# Patient Record
Sex: FEMALE | HISPANIC OR LATINO | Employment: PART TIME | URBAN - METROPOLITAN AREA
[De-identification: names, ages, dates, MRNs, and addresses within clinical notes are randomized per-mention and may not be internally consistent; named-entity substitution may affect disease eponyms.]

---

## 2017-07-10 ENCOUNTER — NON-PROVIDER VISIT (OUTPATIENT)
Dept: OBGYN | Facility: CLINIC | Age: 32
End: 2017-07-10

## 2017-07-10 DIAGNOSIS — Z32.01 PREGNANCY EXAMINATION OR TEST, POSITIVE RESULT: ICD-10-CM

## 2017-07-10 LAB
INT CON NEG: NEGATIVE
INT CON POS: POSITIVE
POC URINE PREGNANCY TEST: POSITIVE

## 2017-07-10 PROCEDURE — 81025 URINE PREGNANCY TEST: CPT | Performed by: OBSTETRICS & GYNECOLOGY

## 2017-07-13 ENCOUNTER — APPOINTMENT (OUTPATIENT)
Dept: OBGYN | Facility: CLINIC | Age: 32
End: 2017-07-13

## 2017-08-28 ENCOUNTER — INITIAL PRENATAL (OUTPATIENT)
Dept: OBGYN | Facility: CLINIC | Age: 32
End: 2017-08-28

## 2017-08-28 ENCOUNTER — HOSPITAL ENCOUNTER (OUTPATIENT)
Dept: LAB | Facility: MEDICAL CENTER | Age: 32
End: 2017-08-28
Attending: OBSTETRICS & GYNECOLOGY

## 2017-08-28 VITALS
DIASTOLIC BLOOD PRESSURE: 60 MMHG | SYSTOLIC BLOOD PRESSURE: 106 MMHG | HEIGHT: 63 IN | BODY MASS INDEX: 29.06 KG/M2 | WEIGHT: 164 LBS

## 2017-08-28 DIAGNOSIS — N94.6 DYSMENORRHEA: ICD-10-CM

## 2017-08-28 DIAGNOSIS — O03.9 SAB (SPONTANEOUS ABORTION): Primary | ICD-10-CM

## 2017-08-28 DIAGNOSIS — Z32.01 PREGNANCY EXAMINATION OR TEST, POSITIVE RESULT: ICD-10-CM

## 2017-08-28 DIAGNOSIS — Z34.81 ENCOUNTER FOR SUPERVISION OF OTHER NORMAL PREGNANCY IN FIRST TRIMESTER: Primary | ICD-10-CM

## 2017-08-28 DIAGNOSIS — O03.9 SAB (SPONTANEOUS ABORTION): ICD-10-CM

## 2017-08-28 PROBLEM — Z34.91 ENCOUNTER FOR SUPERVISION OF NORMAL PREGNANCY IN FIRST TRIMESTER: Status: ACTIVE | Noted: 2017-08-28

## 2017-08-28 LAB
APPEARANCE UR: NORMAL
B-HCG SERPL-ACNC: 8001.2 MIU/ML (ref 0–5)
BILIRUB UR STRIP-MCNC: NORMAL MG/DL
COLOR UR AUTO: NORMAL
GLUCOSE UR STRIP.AUTO-MCNC: NEGATIVE MG/DL
INT CON NEG: NEGATIVE
INT CON POS: POSITIVE
KETONES UR STRIP.AUTO-MCNC: NEGATIVE MG/DL
LEUKOCYTE ESTERASE UR QL STRIP.AUTO: NEGATIVE
NITRITE UR QL STRIP.AUTO: NEGATIVE
PH UR STRIP.AUTO: 6.5 [PH] (ref 5–8)
POC URINE PREGNANCY TEST: POSITIVE
PROGEST SERPL-MCNC: 7.82 NG/ML
PROT UR QL STRIP: NEGATIVE MG/DL
RBC UR QL AUTO: NORMAL
SP GR UR STRIP.AUTO: 1.02
UROBILINOGEN UR STRIP-MCNC: NORMAL MG/DL

## 2017-08-28 PROCEDURE — 84144 ASSAY OF PROGESTERONE: CPT

## 2017-08-28 PROCEDURE — 81025 URINE PREGNANCY TEST: CPT | Performed by: NURSE PRACTITIONER

## 2017-08-28 PROCEDURE — 99213 OFFICE O/P EST LOW 20 MIN: CPT | Mod: 25 | Performed by: OBSTETRICS & GYNECOLOGY

## 2017-08-28 PROCEDURE — 86038 ANTINUCLEAR ANTIBODIES: CPT

## 2017-08-28 PROCEDURE — 84702 CHORIONIC GONADOTROPIN TEST: CPT

## 2017-08-28 PROCEDURE — 36415 COLL VENOUS BLD VENIPUNCTURE: CPT

## 2017-08-28 PROCEDURE — 76817 TRANSVAGINAL US OBSTETRIC: CPT | Performed by: OBSTETRICS & GYNECOLOGY

## 2017-08-28 PROCEDURE — 81002 URINALYSIS NONAUTO W/O SCOPE: CPT | Performed by: NURSE PRACTITIONER

## 2017-08-28 RX ORDER — IBUPROFEN 800 MG/1
800 TABLET ORAL EVERY 8 HOURS PRN
Qty: 30 TAB | Refills: 3 | Status: SHIPPED | OUTPATIENT
Start: 2017-08-28 | End: 2018-08-15

## 2017-08-28 RX ORDER — MISOPROSTOL 200 UG/1
200 TABLET ORAL 4 TIMES DAILY
Qty: 6 TAB | Refills: 0 | COMMUNITY
Start: 2017-08-28 | End: 2018-08-15

## 2017-08-28 ASSESSMENT — ENCOUNTER SYMPTOMS
PSYCHIATRIC NEGATIVE: 1
EYES NEGATIVE: 1
CARDIOVASCULAR NEGATIVE: 1
GASTROINTESTINAL NEGATIVE: 1
CONSTITUTIONAL NEGATIVE: 1
NEUROLOGICAL NEGATIVE: 1
RESPIRATORY NEGATIVE: 1
MUSCULOSKELETAL NEGATIVE: 1

## 2017-08-28 NOTE — LETTER
Estudios Para Detectar los Portadores de la Fibrosis Quística   (La Enfermedad Fibroquística del Páncreas)    Marietta Schaeferquez    Esta información esta relacionada con un examen de kenisha que puede determinar si usted o pimentel nicolasa es portador del gen que causa la enfermedad hereditaria que se llama la fibrosis quística.     ¿QUE ES LA ENFERMEDAD FIBROSIS QUÍSTICA?  · La  fibrosis quística es rosy enfermedad hereditaria que afecta a más de 25,000 niños y jóvenes adultos americanos.  · Los síntomas de la fibrosis quística varían de rosy persona a otra.  Los síntomas más comunes son congestión pulmonar, diarrea y retraso en el crecimiento del ras.  La mayoría de las personas con la fibrosis quística padecen de problemas médicos muy severos, y algunas mueren jóvenes.  Otras tienen tan pocos síntomas que no se percatan de que tienen fibrosis quística.  · La fibrosis quística no afecta en absoluto la inteligencia de la persona.  · Aunque actualmente no hay rosy maral para la fibrosis quística, los científicos están avanzando con respecto a nuevos tratamientos y en la búsqueda de la maral.  Anteriormente la mayoría de las personas que padecían de fibrosis quística morían muy jóvenes.  Hoy en día, muchas personas que padecen de la fibrosis quística sobreviven hasta los 20 o 30 anos de edad.    ¿EXISTE LA POSIBILIDAD DE QUE MI CRYSTAL PUEDA TENER FIBROSIS QUÍSTICA?  · Usted puede tener un hijo con la fibrosis quística aun cuando no hay nadie en pimentel jacqui que la padezca.  (Cindy la grafica que sigue.)  · Existe rosy prueba que puede ayudarle a determinar si mallory un gen para la fibrosis quística y si por eso corre un riesgo de tener un hijo con la enfermedad.  · Si ambos padres son portadores del gen para la fibrosis quística, hay rosy (1) probabilidad entre 4 (25%) en cada embarazo, de que puedan tener un hijo afectada con fibrosis quística.  · Los portadores del gen para la fibrosis quística tienen rosy copia del  gen normal y el otro gen alterado.  · Las personas con fibrosis quística tienen dos genes alterados para la fibrosis quística,  · Normalmente la mayoría de individuos tienen dos copias normales del gen para fibrosis quística.    Riesgo aproximado de que rosy nicolasa sin familiares con fibrosis quística pueda tener un hijo con fibrosis quística:  Origen / Riesgo  Rosy nicolasa Caucásica (de christine win):  1 en 2,500  Rosy nicolasa :  1 en 8,000  Rosy nicolasa Afroamericana (de christine trevon):  1 en 15,000  Rosy nicolasa Asiática:  1 en 32,000    ¿EXISTEN PRUEBAS PARA DETECTAR LOS PORTADORES DE LA FIBROSIS QUÍSTICA?  · Hay rosy prueba de kenisha para determinar si usted o pimentel nicolasa portan el gen para la fibrosis quística.  · Es importante entender que la prueba no detecta todos los portadores del gen para la fibrosis quística.  · Si la prueba determina que ambos padres con portadores, se puede realizar otras pruebas para determinar si pimentel futuro efrain esta afectado.    ¿CUANTO VICENTE LA PRUEBA PARA DETERMINAR SI SOY PORTADOR(A) DEL GEN PARA LA FIBROSIS QUÍSTICA?  · El costo de la prueba varia según pimentel póliza de seguro medico y el laboratorio donde se efectúa el análisis.  · El costo promedio de la prueba es de $780.00  · Pimentel consejera genética puede darle mas información acera de esta prueba para determinar si usted es portador de la fibrosis quística.    _____  Si, yo estoy interesada y me gustaria obtener mas información al respecto.  _____  No tengo interés ni en hacerme la prueba para determinar si soy portador(a) de gen para la fibrosis quística ni en recibir mas información al respecto.    Firma del paciente: _____________________________   8/28/2017

## 2017-08-28 NOTE — PROGRESS NOTES
Pt here for New OB today  Phone: [phone]  Pharmacy verified  Pt is taking PNV  Desires AFP   Desires BTL   Pt c/o occasional HA

## 2017-08-28 NOTE — LETTER
August 28, 2017       Patient: Marietta Paris   YOB: 1985   Date of Visit: 8/28/2017         To Whom It May Concern:    It is my medical opinion that Marietta Paris needs to be off work from today until 9/4/17 due to medical complications.    If you have any questions or concerns, please don't hesitate to call [phone]          Sincerely,          Minh Adan M.D.  Electronically Signed

## 2017-08-28 NOTE — PROGRESS NOTES
S:  Marietta Paris is a 32 y.o.  who presents for her new OB exam.  She is 12w0d with and MELIDA of Estimated Date of Delivery: 3/12/18 based off of LMP . She has no complaints.  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 No ER visits or previous care in this pregnancy.     Desires AFP.  Declines CF.  Denies VB, LOF, or cramping.  Denies dysuria, vaginal DC. Reports no fetal movement.     Pt is  and lives with FOB and kids.  Pregnancy is planned and desired.      UPT + in 7/10/17, GA at that time was 5w0d based off of LMP. Should be 12w0d today. FHT's not able to be detected via doppler. BSUS performed, uterus appears empty. Monica Santiago CNM at bedside to confirm, and again, uterus appears empty. Will recheck UPT, and consult with MD.    UPT+ again. Consult with Dr Adan, will do US and discuss findings with patient.    No past medical history on file.  Family History   Problem Relation Age of Onset   • No Known Problems Mother    • No Known Problems Father    • No Known Problems Sister    • No Known Problems Brother      Social History     Social History   • Marital status:      Spouse name: N/A   • Number of children: N/A   • Years of education: N/A     Occupational History   • Not on file.     Social History Main Topics   • Smoking status: Never Smoker   • Smokeless tobacco: Never Used   • Alcohol use Yes      Comment: socially; before pregnancy   • Drug use: Unknown   • Sexual activity: Yes     Partners: Male     Other Topics Concern   • Not on file     Social History Narrative   • No narrative on file     OB History    Para Term  AB Living   3 2 1 1   2   SAB TAB Ectopic Molar Multiple Live Births             2      # Outcome Date GA Lbr Christofer/2nd Weight Sex Delivery Anes PTL Lv   3 Current            2  04 36w0d  3.175 kg (7 lb) F Vag-Spont  Y JESUS      Birth Comments: No complications   1 Term 02  "40w0d  3.175 kg (7 lb) F Vag-Spont  N JESUS      Birth Comments: No complications          History of Varicella Virus: yes  History of HSV I or II in self or partner: no  History of Thyroid problems: no    O:  Blood pressure 106/60, height 1.6 m (5' 3\"), weight 74.4 kg (164 lb), last menstrual period 06/05/2017.   See Prenatal Physical.    Wet mount: Deferred, no s/sx      A:   1.  IUP @ 12w0d per LMP        2.  S=D        3.  See problem list below        4.  ? AB, consult with MD for viability       Patient Active Problem List    Diagnosis Date Noted   • Encounter for supervision of normal pregnancy in first trimester 08/28/2017         P:  1.  GC/CT & pap not done- patient declines until pregnancy is confirmed.        2.  Prenatal labs ordered - lab slip given        3.  Discussed PNV, diet, avoidances and adequate water intake        4.  NOB packet given        5.  Return to office in 4 wks        6.  Complete OB US in 8 wks        7.  US with OB to determine viability    No orders of the defined types were placed in this encounter.      HPI    Review of Systems   Constitutional: Negative.    HENT: Negative.    Eyes: Negative.    Respiratory: Negative.    Cardiovascular: Negative.    Gastrointestinal: Negative.    Genitourinary: Negative.    Musculoskeletal: Negative.    Skin: Negative.    Neurological: Negative.    Endo/Heme/Allergies: Negative.    Psychiatric/Behavioral: Negative.    All other systems reviewed and are negative.         Objective:     /60   Ht 1.6 m (5' 3\")   Wt 74.4 kg (164 lb)   LMP 06/05/2017 (Exact Date)   BMI 29.05 kg/m²      Physical Exam   Constitutional: She is oriented to person, place, and time. She appears well-developed and well-nourished.   HENT:   Head: Normocephalic and atraumatic.   Nose: Nose normal.   Eyes: Conjunctivae and EOM are normal.   Neck: Normal range of motion. Neck supple.   Cardiovascular: Normal rate, regular rhythm, normal heart sounds and intact distal " pulses.    Pulmonary/Chest: Effort normal and breath sounds normal.   Abdominal: Soft. Bowel sounds are normal.   Genitourinary: Vagina normal and uterus normal.   Musculoskeletal: Normal range of motion.   Neurological: She is alert and oriented to person, place, and time. She has normal reflexes.   Skin: Skin is warm and dry. Capillary refill takes less than 2 seconds.   Psychiatric: She has a normal mood and affect. Her behavior is normal. Judgment and thought content normal.   Nursing note and vitals reviewed.         Assessment/Plan:     1. Encounter for supervision of other normal pregnancy in first trimester  MELIDA 3/12/18 per LMP. Refer to MD to determine viability  - PREG CNTR PRENATAL PN; Future  - THINPREP RFLX HPV ASCUS W/CTNG; Future  - US-OB 2ND 3RD TRI COMPLETE; Future  - POCT Urinalysis

## 2017-08-28 NOTE — PROGRESS NOTES
Marietta Paris,  32 y.o.  female presents today with a C/O of :oligomenorrhea. Pt   Patient's last menstrual period was 2017 (exact date).       Subjective : Nausea/Vomiting: No:  Abdominal /pelvic cramping : No :   vaginal bleeding:No      Menstrual Flow : moderate   GYN ROS:  normal menses, no abnormal bleeding, pelvic pain or discharge, no breast pain or new or enlarging lumps on self exam      No past medical history on file.    No past surgical history on file.    Current Birth control:  none    OB History    Para Term  AB Living   3 2 1 1   2   SAB TAB Ectopic Molar Multiple Live Births             2      # Outcome Date GA Lbr Christofer/2nd Weight Sex Delivery Anes PTL Lv   3 Current            2  04 36w0d  3.175 kg (7 lb) F Vag-Spont  Y JESUS      Birth Comments: No complications   1 Term 02 40w0d  3.175 kg (7 lb) F Vag-Spont  N JESUS      Birth Comments: No complications              Allergy:      Review of patient's allergies indicates no known allergies.    Exam;    LMP 2017 (Exact Date)   well-appearing, well-hydrated, well-nourished, overweight, in no apparent distress  normal;   PERRLA, EOMI, fundi grossly normal, no papilledema, no AV nicking, sclera clear  Clear to auscultation  RRR No M  abdomen is soft without significant tenderness, masses, organomegaly or guarding  External genitalia normal, Vagina normal without discharge, Urethra without abnormality or dischargeLab.    Recent Results (from the past 336 hour(s))   POCT Urinalysis    Collection Time: 17  1:02 PM   Result Value Ref Range    POC Color  Negative    POC Appearance  Negative    POC Leukocyte Esterase NEGATIVE Negative    POC Nitrites NEGATIVE Negative    POC Urobiligen  Negative (0.2) mg/dL    POC Protein NEGATIVE Negative mg/dL    POC Urine PH 6.5 5.0 - 8.0    POC Blood HEMOLYZED Negative    POC Specific Gravity 1.020 <1.005 - >1.030    POC Ketones NEGATIVE Negative mg/dL     POC Biliruben  Negative mg/dL    POC Glucose NEGATIVE Negative mg/dL   POCT Pregnancy    Collection Time: 08/28/17  1:55 PM   Result Value Ref Range    POC Urine Pregnancy Test POSITIVE Negative    Internal Control Positive Positive     Internal Control Negative Negative      Ultrasound :     First trimester findings: Intrauterine gestational sac seen: yes  Gestational sac summary: empty sac  Fetal cardiac activity: absent  Crown-rump length: no pole , no debris ,     Assessment:    Blighted ovum   Spent 25 minutes with the patient ; Face to Face, with >50% of this time spent in counseling and coordination of care, surrounding the above mentioned issues as well as: explained possible causes for pregnancy lost , what work up should be done and options to terminate pregnancy     Plan:  Cytotec 600 ug per vagina , followed by 600 ug orally after 2 hr  Motrin 800 mg   Explained in Citizen of [place] to patient and SO , what to expect   4 weeks  Serial prog/hcg , Check TOMASZ as well

## 2017-08-30 ENCOUNTER — HOSPITAL ENCOUNTER (OUTPATIENT)
Dept: LAB | Facility: MEDICAL CENTER | Age: 32
End: 2017-08-30
Attending: OBSTETRICS & GYNECOLOGY
Payer: COMMERCIAL

## 2017-08-30 ENCOUNTER — TELEPHONE (OUTPATIENT)
Dept: OBGYN | Facility: CLINIC | Age: 32
End: 2017-08-30

## 2017-08-30 DIAGNOSIS — N94.6 DYSMENORRHEA: ICD-10-CM

## 2017-08-30 DIAGNOSIS — O03.9 SAB (SPONTANEOUS ABORTION): ICD-10-CM

## 2017-08-30 LAB
B-HCG SERPL-ACNC: 2839.3 MIU/ML (ref 0–5)
NUCLEAR IGG SER QL IA: NORMAL
PROGEST SERPL-MCNC: 2.37 NG/ML

## 2017-08-30 NOTE — TELEPHONE ENCOUNTER
----- Message from Minh Adan M.D. sent at 8/29/2017  9:08 PM PDT -----  C/w blighted ovum      Called pt and informed as above and advised to follow Dr. Adan's instructions. Pt verbalized understanding. Pt stated she did not make the f/u appt as instructed by Dr. Adan. Pt transferred to [facility] to schedule f/u appt.

## 2017-09-29 ENCOUNTER — POST PARTUM (OUTPATIENT)
Dept: OBGYN | Facility: CLINIC | Age: 32
End: 2017-09-29

## 2017-09-29 ENCOUNTER — HOSPITAL ENCOUNTER (OUTPATIENT)
Dept: LAB | Facility: MEDICAL CENTER | Age: 32
End: 2017-09-29
Attending: OBSTETRICS & GYNECOLOGY
Payer: COMMERCIAL

## 2017-09-29 VITALS
HEIGHT: 63 IN | SYSTOLIC BLOOD PRESSURE: 112 MMHG | WEIGHT: 166 LBS | DIASTOLIC BLOOD PRESSURE: 58 MMHG | BODY MASS INDEX: 29.41 KG/M2

## 2017-09-29 DIAGNOSIS — O03.9 SAB (SPONTANEOUS ABORTION): ICD-10-CM

## 2017-09-29 DIAGNOSIS — Z32.02 PREGNANCY EXAMINATION OR TEST, NEGATIVE RESULT: ICD-10-CM

## 2017-09-29 LAB
ABO GROUP BLD: NORMAL
INT CON NEG: NEGATIVE
INT CON POS: POSITIVE
POC URINE PREGNANCY TEST: NEGATIVE
RH BLD: NORMAL

## 2017-09-29 PROCEDURE — 81025 URINE PREGNANCY TEST: CPT | Performed by: OBSTETRICS & GYNECOLOGY

## 2017-09-29 PROCEDURE — 99213 OFFICE O/P EST LOW 20 MIN: CPT | Mod: 25 | Performed by: OBSTETRICS & GYNECOLOGY

## 2017-09-29 NOTE — PROGRESS NOTES
32-year-old  112 who is here today for follow-up of SAB, patient was given intravaginal Cytotec followed by by mouth Cytotec over one month ago. Patient states that after she got the Cytotec she had passed all of the tissue that evening, since then she is been doing well and believe she may have had a period on . She is currently without complaints today no bleeding or spotting. No abdominal pain, no fevers chest pain shortness of breath    Physical exam is deferred    Urine pregnancy test today is negative    32-year-old female status post SAB with medical termination  Blood type has not been reviewed or checked on this patient so will order ABO with Rh determination today  Over one month now from termination, may or may not be able to give RhoGAM  Hopefully patient's blood type is Rh+

## 2017-09-29 NOTE — NON-PROVIDER
SAB f/u today  Cytotec given on 8-28-17  Pt reports she passed everything on the night of 8-28-17, had heavy bleeding for 2 weeks and stopped; pt states she started bleeding again on 9-24-17  Phone: [phone]

## 2018-04-12 ENCOUNTER — NON-PROVIDER VISIT (OUTPATIENT)
Dept: OBGYN | Facility: CLINIC | Age: 33
End: 2018-04-12

## 2018-04-12 DIAGNOSIS — Z32.01 POSITIVE URINE PREGNANCY TEST: ICD-10-CM

## 2018-04-12 LAB
INT CON NEG: NEGATIVE
INT CON POS: POSITIVE
POC URINE PREGNANCY TEST: POSITIVE

## 2018-04-12 PROCEDURE — 81025 URINE PREGNANCY TEST: CPT | Performed by: OBSTETRICS & GYNECOLOGY

## 2018-05-02 ENCOUNTER — INITIAL PRENATAL (OUTPATIENT)
Dept: OBGYN | Facility: CLINIC | Age: 33
End: 2018-05-02

## 2018-05-02 VITALS — SYSTOLIC BLOOD PRESSURE: 118 MMHG | WEIGHT: 170 LBS | BODY MASS INDEX: 30.11 KG/M2 | DIASTOLIC BLOOD PRESSURE: 70 MMHG

## 2018-05-02 DIAGNOSIS — N93.8 DUB (DYSFUNCTIONAL UTERINE BLEEDING): ICD-10-CM

## 2018-05-02 PROCEDURE — 76830 TRANSVAGINAL US NON-OB: CPT | Performed by: OBSTETRICS & GYNECOLOGY

## 2018-05-02 PROCEDURE — 99213 OFFICE O/P EST LOW 20 MIN: CPT | Mod: 25 | Performed by: OBSTETRICS & GYNECOLOGY

## 2018-05-02 RX ORDER — DOXYLAMINE SUCCINATE AND PYRIDOXINE HYDROCHLORIDE, DELAYED RELEASE TABLETS 10 MG/10 MG 10; 10 MG/1; MG/1
2 TABLET, DELAYED RELEASE ORAL
Qty: 60 TAB | Refills: 2 | Status: ON HOLD | OUTPATIENT
Start: 2018-05-02 | End: 2018-12-07

## 2018-05-02 NOTE — PROGRESS NOTES
Patient here for a   LMP: 3/5/2018  Patient is taking PNV   Phone #: [phone]  Pharmacy Confirmed w/Pt.  C/O: Nausea and Headaches

## 2018-05-02 NOTE — PROGRESS NOTES
Chief complaint;  This patient is a 33 y.o. female , Patient's last menstrual period was 2018. presents complaining of dysfunctional uterine bleeding.    Review of systems-denies; chest pain, shortness of breath, fever, chills, abdominal pain  Past OB history-  OB History    Para Term  AB Living   4 2 1 1 1 2   SAB TAB Ectopic Molar Multiple Live Births   1         2      # Outcome Date GA Lbr Christofer/2nd Weight Sex Delivery Anes PTL Lv   4 Current            3 SAB 17 12w0d             Birth Comments: No D&C   2  04 36w0d  3.175 kg (7 lb) F Vag-Spont  Y JESUS      Birth Comments: No complications   1 Term 02 40w0d  3.175 kg (7 lb) F Vag-Spont  N JESUS      Birth Comments: No complications        Past surgical history- History reviewed. No pertinent surgical history.  Past medical history- History reviewed. No pertinent past medical history.  Allergies- Patient has no known allergies.  Present medications-   Outpatient Encounter Prescriptions as of 2018   Medication Sig Dispense Refill   • Prenatal Multivit-Min-Fe-FA (PRENATAL VITAMINS PO) Take  by mouth.     • misoprostol (CYTOTEC) 200 MCG Tab Take 1 Tab by mouth 4 times a day. (Patient not taking: Reported on 2018) 6 Tab 0   • ibuprofen (MOTRIN) 800 MG Tab Take 1 Tab by mouth every 8 hours as needed for Moderate Pain. (Patient not taking: Reported on 2018) 30 Tab 3     No facility-administered encounter medications on file as of 2018.      Family history- no history of breast or ovarian cancer  Social history-   Social History     Social History   • Marital status:      Spouse name: N/A   • Number of children: N/A   • Years of education: N/A     Occupational History   • Not on file.     Social History Main Topics   • Smoking status: Never Smoker   • Smokeless tobacco: Never Used   • Alcohol use Yes      Comment: socially; before pregnancy   • Drug use: Unknown   • Sexual activity: Yes     Partners:  Male      Comment: planned pregnancy      Other Topics Concern   • Not on file     Social History Narrative   • No narrative on file         Physical examination;   Alert and oriented x3  General-well-developed well-nourished female in no apparent distress  Vitals:    05/02/18 0842   BP: 118/70   Weight: 77.1 kg (170 lb)     Skin is warm and dry   HEENT-normocephalic,nontraumatic,PERRLA,EOMI  Throat- no edema or erythema  Neck-supple  Cardiovascular-regular rate and rhythm, normal S1-S2 without murmurs or gallops  Lungs-clear to auscultation bilaterally  Back-negative CVA tenderness  Abdomen-nondistended positive bowel sounds soft nontender without masses or hepatosplenomegaly  Pelvic examination;  External genitalia-without lesions  Vagina-no blood, no discharge  Cervix-closed,no gross lesions  Uterus-  8 weeks size, nontender  Adnexa- without mass or tenderness  Extremities-without cyanosis clubbing or edema  Neurologic-grossly intact    Transvaginal ultrasound; as performed and read by myself; intrauterine gestational sac containing wetzel pregnancy positive cardiac motion crown-rump length consistent with 8 weeks 5 days, EDC 12/7/18, no free pelvic fluid, no obvious adnexal masses      Impression;  Dysfunctional uterine bleeding-viable pregnancy at 8 weeks    Plan;        Patient to followup 4 weeks for initial OB

## 2018-05-02 NOTE — PROGRESS NOTES
Chief complaint;  This patient is a 33 y.o. female , Patient's last menstrual period was 2018. presents complaining of dysfunctional uterine bleeding.    Review of systems-denies; chest pain, shortness of breath, fever, chills, abdominal pain  Past OB history-  OB History    Para Term  AB Living   4 2 1 1 1 2   SAB TAB Ectopic Molar Multiple Live Births   1         2      # Outcome Date GA Lbr Christofer/2nd Weight Sex Delivery Anes PTL Lv   4 Current            3 SAB 17 12w0d             Birth Comments: No D&C   2  04 36w0d  3.175 kg (7 lb) F Vag-Spont  Y JESUS      Birth Comments: No complications   1 Term 02 40w0d  3.175 kg (7 lb) F Vag-Spont  N JESUS      Birth Comments: No complications        Past surgical history- History reviewed. No pertinent surgical history.  Past medical history- History reviewed. No pertinent past medical history.  Allergies- Patient has no known allergies.  Present medications-   Outpatient Encounter Prescriptions as of 2018   Medication Sig Dispense Refill   • Prenatal Multivit-Min-Fe-FA (PRENATAL VITAMINS PO) Take  by mouth.     • misoprostol (CYTOTEC) 200 MCG Tab Take 1 Tab by mouth 4 times a day. (Patient not taking: Reported on 2018) 6 Tab 0   • ibuprofen (MOTRIN) 800 MG Tab Take 1 Tab by mouth every 8 hours as needed for Moderate Pain. (Patient not taking: Reported on 2018) 30 Tab 3     No facility-administered encounter medications on file as of 2018.      Family history- no history of breast or ovarian cancer  Social history-   Social History     Social History   • Marital status:      Spouse name: N/A   • Number of children: N/A   • Years of education: N/A     Occupational History   • Not on file.     Social History Main Topics   • Smoking status: Never Smoker   • Smokeless tobacco: Never Used   • Alcohol use Yes      Comment: socially; before pregnancy   • Drug use: Unknown   • Sexual activity: Yes     Partners:  Male      Comment: planned pregnancy      Other Topics Concern   • Not on file     Social History Narrative   • No narrative on file         Physical examination;   Alert and oriented x3  General-well-developed well-nourished female in no apparent distress  There were no vitals filed for this visit.  Skin is warm and dry   HEENT-normocephalic,nontraumatic,PERRLA,EOMI  Throat- no edema or erythema  Neck-supple  Cardiovascular-regular rate and rhythm, normal S1-S2 without murmurs or gallops  Lungs-clear to auscultation bilaterally  Back-negative CVA tenderness  Abdomen-nondistended positive bowel sounds soft nontender without masses or hepatosplenomegaly  Pelvic examination;  External genitalia-without lesions  Vagina-no blood, no discharge  Cervix-closed,no gross lesions  Uterus-  *** weeks size, nontender  Adnexa- without mass or tenderness  Extremities-without cyanosis clubbing or edema  Neurologic-grossly intact    Transvaginal ultrasound; as performed and read by myself; ***    Impression;  Dysfunctional uterine bleeding- ***    Plan;   SS- ***  Labs today   Patient to followup ***      *** Minutes total spent with the patient in face-to-face contact,5 minutes of total time utilized to perform  Ultrasound, greater than 50% of the time has been spent on counseling and coordination of care. Many questions answered regarding possible miscarriage.

## 2018-05-09 ENCOUNTER — APPOINTMENT (OUTPATIENT)
Dept: OBGYN | Facility: CLINIC | Age: 33
End: 2018-05-09

## 2018-05-30 ENCOUNTER — TELEPHONE (OUTPATIENT)
Dept: OBGYN | Facility: CLINIC | Age: 33
End: 2018-05-30

## 2018-05-30 NOTE — TELEPHONE ENCOUNTER
Pt C/O states having headaches and cough, wants to know what she can take for this.    Unable to contact pt msg left to call back.     5/30/18 @ 09066, advised to use OTC Tylenol for headaches, Robitussin or Mucinex for cough, ocean nasal spray for stuffy nose, plenty water.  Verbalized understanding.

## 2018-06-22 ENCOUNTER — INITIAL PRENATAL (OUTPATIENT)
Dept: OBGYN | Facility: CLINIC | Age: 33
End: 2018-06-22

## 2018-06-22 ENCOUNTER — HOSPITAL ENCOUNTER (OUTPATIENT)
Facility: MEDICAL CENTER | Age: 33
End: 2018-06-22
Attending: PHYSICIAN ASSISTANT
Payer: COMMERCIAL

## 2018-06-22 VITALS — BODY MASS INDEX: 29.94 KG/M2 | DIASTOLIC BLOOD PRESSURE: 72 MMHG | WEIGHT: 169 LBS | SYSTOLIC BLOOD PRESSURE: 114 MMHG

## 2018-06-22 DIAGNOSIS — O09.892 HISTORY OF PRETERM DELIVERY, CURRENTLY PREGNANT IN SECOND TRIMESTER: ICD-10-CM

## 2018-06-22 DIAGNOSIS — Z34.80 SUPERVISION OF OTHER NORMAL PREGNANCY, ANTEPARTUM: Primary | ICD-10-CM

## 2018-06-22 DIAGNOSIS — Z34.80 SUPERVISION OF OTHER NORMAL PREGNANCY, ANTEPARTUM: ICD-10-CM

## 2018-06-22 LAB
APPEARANCE UR: NORMAL
BILIRUB UR STRIP-MCNC: NORMAL MG/DL
COLOR UR AUTO: NORMAL
GLUCOSE UR STRIP.AUTO-MCNC: NEGATIVE MG/DL
KETONES UR STRIP.AUTO-MCNC: NEGATIVE MG/DL
LEUKOCYTE ESTERASE UR QL STRIP.AUTO: NORMAL
NITRITE UR QL STRIP.AUTO: NEGATIVE
PH UR STRIP.AUTO: 5.5 [PH] (ref 5–8)
PROT UR QL STRIP: NEGATIVE MG/DL
RBC UR QL AUTO: NORMAL
SP GR UR STRIP.AUTO: 1.02
UROBILINOGEN UR STRIP-MCNC: NORMAL MG/DL

## 2018-06-22 PROCEDURE — 81002 URINALYSIS NONAUTO W/O SCOPE: CPT | Performed by: PHYSICIAN ASSISTANT

## 2018-06-22 PROCEDURE — 59401 PR NEW OB VISIT: CPT | Performed by: PHYSICIAN ASSISTANT

## 2018-06-22 PROCEDURE — 8198 PREG CTR PKG RATE (SYSTEM): Performed by: PHYSICIAN ASSISTANT

## 2018-06-22 NOTE — LETTER
Estudios Para Detectar los Portadores de la Fibrosis Quística   (La Enfermedad Fibroquística del Páncreas)    Marietta Schaeferquez    Esta información esta relacionada con un examen de kenisha que puede determinar si usted o pimentel nicolasa es portador del gen que causa la enfermedad hereditaria que se llama la fibrosis quística.     ¿QUE ES LA ENFERMEDAD FIBROSIS QUÍSTICA?  · La  fibrosis quística es rosy enfermedad hereditaria que afecta a más de 25,000 niños y jóvenes adultos americanos.  · Los síntomas de la fibrosis quística varían de rosy persona a otra.  Los síntomas más comunes son congestión pulmonar, diarrea y retraso en el crecimiento del ras.  La mayoría de las personas con la fibrosis quística padecen de problemas médicos muy severos, y algunas mueren jóvenes.  Otras tienen tan pocos síntomas que no se percatan de que tienen fibrosis quística.  · La fibrosis quística no afecta en absoluto la inteligencia de la persona.  · Aunque actualmente no hay rosy maral para la fibrosis quística, los científicos están avanzando con respecto a nuevos tratamientos y en la búsqueda de la maral.  Anteriormente la mayoría de las personas que padecían de fibrosis quística morían muy jóvenes.  Hoy en día, muchas personas que padecen de la fibrosis quística sobreviven hasta los 20 o 30 anos de edad.    ¿EXISTE LA POSIBILIDAD DE QUE MI CRYSTAL PUEDA TENER FIBROSIS QUÍSTICA?  · Usted puede tener un hijo con la fibrosis quística aun cuando no hay nadie en pimentel jacqui que la padezca.  (Cindy la grafica que sigue.)  · Existe rosy prueba que puede ayudarle a determinar si mallory un gen para la fibrosis quística y si por eso corre un riesgo de tener un hijo con la enfermedad.  · Si ambos padres son portadores del gen para la fibrosis quística, hay rosy (1) probabilidad entre 4 (25%) en cada embarazo, de que puedan tener un hijo afectada con fibrosis quística.  · Los portadores del gen para la fibrosis quística tienen rosy copia del  gen normal y el otro gen alterado.  · Las personas con fibrosis quística tienen dos genes alterados para la fibrosis quística,  · Normalmente la mayoría de individuos tienen dos copias normales del gen para fibrosis quística.    Riesgo aproximado de que rosy nicolasa sin familiares con fibrosis quística pueda tener un hijo con fibrosis quística:  Origen / Riesgo  Rosy nicolasa Caucásica (de christine win):  1 en 2,500  Rosy nicolasa :  1 en 8,000  Rosy nicolasa Afroamericana (de christine trevon):  1 en 15,000  Rosy nicolasa Asiática:  1 en 32,000    ¿EXISTEN PRUEBAS PARA DETECTAR LOS PORTADORES DE LA FIBROSIS QUÍSTICA?  · Hay rosy prueba de kenisha para determinar si usted o pimentel nicolasa portan el gen para la fibrosis quística.  · Es importante entender que la prueba no detecta todos los portadores del gen para la fibrosis quística.  · Si la prueba determina que ambos padres con portadores, se puede realizar otras pruebas para determinar si pimentel futuro efrain esta afectado.    ¿CUANTO VICENTE LA PRUEBA PARA DETERMINAR SI SOY PORTADOR(A) DEL GEN PARA LA FIBROSIS QUÍSTICA?  · El costo de la prueba varia según pimentel póliza de seguro medico y el laboratorio donde se efectúa el análisis.  · El costo promedio de la prueba es de $300.  · Pimentel consejera genética puede darle mas información acera de esta prueba para determinar si usted es portador de la fibrosis quística.    _____  Si, yo estoy interesada y me gustaria obtener mas información al respecto.  _____  No tengo interés ni en hacerme la prueba para determinar si soy portador(a) de gen para la fibrosis quística ni en recibir mas información al respecto.    Firma del paciente: _____________________________   6/22/2018

## 2018-06-22 NOTE — PROGRESS NOTES
Pt. Here for NOB visit today.  # [phone]  Pt had a  visit on 5/2/18  Pt. States having nausea   Pharmacy verified.   AFP lab slip given today along with instructions.

## 2018-06-22 NOTE — PROGRESS NOTES
New ob visit. Pt sure of LMP and  done confirms MELIDA 12/10/18. Pt has hx of 2  without complications, no GDM, PIH or delivery complications, though 2nd was at 36wk. Pt then had SAB without complications. Pt denies PMHx, SHx. NKDA. Taking PNV only. Denies Tob, etoh or drug use. Pt currently denies cramping, bleeding or pain though pt does have continued nausea and HA, though only drinking 2 bottles water daily, so urged to incr water intake to 1 gallon daily. Also, pt drinks a lot of Coke daily, so will work on cutting back, incr water. Only small FM.     PE: See below. Size c/w dates though slightly enlarged. +FHT by Doppler. Wet mt: +Yeast  PAP smear performed by ADRIANA Koehler student.    A/P: 1. IUP at 15w4d - PAP, GC/CT today. PNL, AFP slip given. US 4-5 wk. RTC 4 wks.  2. Yeast infection - Monistat 7 recommended  3. Nausea - info given today, pt to call if meds desired

## 2018-06-26 LAB
C TRACH DNA GENITAL QL NAA+PROBE: NEGATIVE
CYTOLOGY REG CYTOL: NORMAL
N GONORRHOEA DNA GENITAL QL NAA+PROBE: NEGATIVE
SPECIMEN SOURCE: NORMAL

## 2018-07-12 ENCOUNTER — HOSPITAL ENCOUNTER (OUTPATIENT)
Dept: LAB | Facility: MEDICAL CENTER | Age: 33
End: 2018-07-12
Attending: PHYSICIAN ASSISTANT
Payer: COMMERCIAL

## 2018-07-12 DIAGNOSIS — Z34.80 SUPERVISION OF OTHER NORMAL PREGNANCY, ANTEPARTUM: ICD-10-CM

## 2018-07-12 LAB
ABO GROUP BLD: NORMAL
APPEARANCE UR: CLEAR
BACTERIA #/AREA URNS HPF: ABNORMAL /HPF
BASOPHILS # BLD AUTO: 0.7 % (ref 0–1.8)
BASOPHILS # BLD: 0.07 K/UL (ref 0–0.12)
BILIRUB UR QL STRIP.AUTO: NEGATIVE
BLD GP AB SCN SERPL QL: NORMAL
COLOR UR: YELLOW
EOSINOPHIL # BLD AUTO: 0.08 K/UL (ref 0–0.51)
EOSINOPHIL NFR BLD: 0.8 % (ref 0–6.9)
EPI CELLS #/AREA URNS HPF: ABNORMAL /HPF
ERYTHROCYTE [DISTWIDTH] IN BLOOD BY AUTOMATED COUNT: 45.7 FL (ref 35.9–50)
GLUCOSE UR STRIP.AUTO-MCNC: NEGATIVE MG/DL
HBV SURFACE AG SER QL: NEGATIVE
HCT VFR BLD AUTO: 37.4 % (ref 37–47)
HGB BLD-MCNC: 12.6 G/DL (ref 12–16)
HIV 1+2 AB+HIV1 P24 AG SERPL QL IA: NON REACTIVE
HYALINE CASTS #/AREA URNS LPF: ABNORMAL /LPF
IMM GRANULOCYTES # BLD AUTO: 0.03 K/UL (ref 0–0.11)
IMM GRANULOCYTES NFR BLD AUTO: 0.3 % (ref 0–0.9)
KETONES UR STRIP.AUTO-MCNC: NEGATIVE MG/DL
LEUKOCYTE ESTERASE UR QL STRIP.AUTO: ABNORMAL
LYMPHOCYTES # BLD AUTO: 1.66 K/UL (ref 1–4.8)
LYMPHOCYTES NFR BLD: 16.7 % (ref 22–41)
MCH RBC QN AUTO: 31.7 PG (ref 27–33)
MCHC RBC AUTO-ENTMCNC: 33.7 G/DL (ref 33.6–35)
MCV RBC AUTO: 94 FL (ref 81.4–97.8)
MICRO URNS: ABNORMAL
MONOCYTES # BLD AUTO: 0.51 K/UL (ref 0–0.85)
MONOCYTES NFR BLD AUTO: 5.1 % (ref 0–13.4)
NEUTROPHILS # BLD AUTO: 7.6 K/UL (ref 2–7.15)
NEUTROPHILS NFR BLD: 76.4 % (ref 44–72)
NITRITE UR QL STRIP.AUTO: NEGATIVE
NRBC # BLD AUTO: 0 K/UL
NRBC BLD-RTO: 0 /100 WBC
PH UR STRIP.AUTO: 8 [PH]
PLATELET # BLD AUTO: 221 K/UL (ref 164–446)
PMV BLD AUTO: 10.9 FL (ref 9–12.9)
PROT UR QL STRIP: NEGATIVE MG/DL
RBC # BLD AUTO: 3.98 M/UL (ref 4.2–5.4)
RBC # URNS HPF: ABNORMAL /HPF
RBC UR QL AUTO: NEGATIVE
RH BLD: NORMAL
RUBV AB SER QL: >500 IU/ML
SP GR UR STRIP.AUTO: 1.02
TREPONEMA PALLIDUM IGG+IGM AB [PRESENCE] IN SERUM OR PLASMA BY IMMUNOASSAY: NON REACTIVE
UROBILINOGEN UR STRIP.AUTO-MCNC: 0.2 MG/DL
WBC # BLD AUTO: 10 K/UL (ref 4.8–10.8)
WBC #/AREA URNS HPF: ABNORMAL /HPF

## 2018-07-15 LAB
# FETUSES US: NORMAL
AFP MOM SERPL: 1.24
AFP SERPL-MCNC: 52 NG/ML
AGE - REPORTED: 33.8 YR
CURRENT SMOKER: NO
FAMILY MEMBER DISEASES HX: NO
GA METHOD: NORMAL
GA: NORMAL WK
HCG MOM SERPL: 0.92
HCG SERPL-ACNC: NORMAL IU/L
HX OF HEREDITARY DISORDERS: NO
IDDM PATIENT QL: NO
INHIBIN A MOM SERPL: 1.12
INHIBIN A SERPL-MCNC: 170 PG/ML
INTEGRATED SCN PATIENT-IMP: NORMAL
PATHOLOGY STUDY: NORMAL
SPECIMEN DRAWN SERPL: NORMAL
U ESTRIOL MOM SERPL: 1.77
U ESTRIOL SERPL-MCNC: 2.75 NG/ML

## 2018-07-18 ENCOUNTER — ROUTINE PRENATAL (OUTPATIENT)
Dept: OBGYN | Facility: CLINIC | Age: 33
End: 2018-07-18

## 2018-07-18 VITALS — DIASTOLIC BLOOD PRESSURE: 68 MMHG | BODY MASS INDEX: 30.11 KG/M2 | WEIGHT: 170 LBS | SYSTOLIC BLOOD PRESSURE: 108 MMHG

## 2018-07-18 DIAGNOSIS — O09.892 HISTORY OF PRETERM DELIVERY, CURRENTLY PREGNANT IN SECOND TRIMESTER: ICD-10-CM

## 2018-07-18 PROBLEM — Z34.92 ENCOUNTER FOR SUPERVISION OF NORMAL PREGNANCY IN SECOND TRIMESTER: Status: ACTIVE | Noted: 2017-08-28

## 2018-07-18 PROCEDURE — 90040 PR PRENATAL FOLLOW UP: CPT | Performed by: NURSE PRACTITIONER

## 2018-07-18 NOTE — PROGRESS NOTES
Pt. Here for OB/FU today. Reports Good FM.   U/S on 8/1/18  Good # [phone]  Pt states having some nausea.   Pharmacy verified.

## 2018-07-18 NOTE — PROGRESS NOTES
SUBJECTIVE:  Pt is a 33 y.o.   at 19w2d  gestation. Presents today for follow-up prenatal care. Reports no issues at this time.  Reports fetal movement. Denies cramping/contractions, bleeding or leaking of fluid. Denies dysuria, headaches. Generally feels well today. Reports mild N/V that is improving for which she does not desire an intervention.     OBJECTIVE:  - See prenatal vitals flow  -   Vitals:    18 1134   BP: 108/68   Weight: 77.1 kg (170 lb)                 ASSESSMENT:   - IUP at 19w2d    - S=D   -   Patient Active Problem List    Diagnosis Date Noted   • History of PTD at 36wk x 1 2018   • Encounter for supervision of normal pregnancy in second trimester 2017         PLAN:  - S/sx pregnancy and labor warning signs vs general discomforts discussed  - Fetal movements and kick counts reviewed   - Adequate hydration reinforced  - Nutrition/exercise/vitamin education; continued PNV  - Anticipatory guidance given  -    - RTC in 4 weeks for follow-up prenatal care

## 2018-08-01 ENCOUNTER — APPOINTMENT (OUTPATIENT)
Dept: RADIOLOGY | Facility: IMAGING CENTER | Age: 33
End: 2018-08-01
Attending: PHYSICIAN ASSISTANT

## 2018-08-01 ENCOUNTER — DATING (OUTPATIENT)
Dept: OBGYN | Facility: CLINIC | Age: 33
End: 2018-08-01

## 2018-08-01 DIAGNOSIS — Z34.80 SUPERVISION OF OTHER NORMAL PREGNANCY, ANTEPARTUM: ICD-10-CM

## 2018-08-01 PROCEDURE — 76805 OB US >/= 14 WKS SNGL FETUS: CPT | Mod: 26 | Performed by: OBSTETRICS & GYNECOLOGY

## 2018-08-15 ENCOUNTER — ROUTINE PRENATAL (OUTPATIENT)
Dept: OBGYN | Facility: CLINIC | Age: 33
End: 2018-08-15

## 2018-08-15 VITALS — WEIGHT: 171 LBS | DIASTOLIC BLOOD PRESSURE: 68 MMHG | BODY MASS INDEX: 30.29 KG/M2 | SYSTOLIC BLOOD PRESSURE: 110 MMHG

## 2018-08-15 DIAGNOSIS — Z34.92 ENCOUNTER FOR SUPERVISION OF NORMAL PREGNANCY IN SECOND TRIMESTER, UNSPECIFIED GRAVIDITY: Primary | ICD-10-CM

## 2018-08-15 DIAGNOSIS — O09.892 HISTORY OF PRETERM DELIVERY, CURRENTLY PREGNANT IN SECOND TRIMESTER: ICD-10-CM

## 2018-08-15 PROCEDURE — 90040 PR PRENATAL FOLLOW UP: CPT | Performed by: NURSE PRACTITIONER

## 2018-08-15 NOTE — PROGRESS NOTES
Pt here today for OB follow up  Pt states no complaints   Reports +  Good # [phone]  Pharmacy Confirmed.  Chaperone offered and not indicated.   Pt given 1 hr GTT and instructions

## 2018-08-15 NOTE — PATIENT INSTRUCTIONS
P:  1. Questions answered.           2. Encouraged adequate water intake        3.   labor precautions reviewed.        4.  F/u 4 wks.        5.  Reviewed US results w pt.         6.  28wk labs ordered.

## 2018-08-15 NOTE — PROGRESS NOTES
S:  Pt is  at 23w2d here for routine OB follow up.  No c/o today.  Reports good FM.  Denies VB, RUCs, LOF or vaginal DC.    O:  Please see above vitals.        FHTs: 167        Fundal ht: 23 cm.    Complete OB US  2018 7:56 AM    HISTORY/REASON FOR EXAM:  Evaluate fetal anatomy, alpha-fetoprotein within normal limits    TECHNIQUE/EXAM DESCRIPTION: OB complete ultrasound.    COMPARISON:  None    FINDINGS:  Fetal Lie:  Variable  LMP:  3/5/2018  Clinical MELIDA by LMP:  12/10/2018    Placenta (Location):  Anterior, wraps right posterior  Placenta Previa: No  Placental Grade: I    Amniotic Fluid Volume:  LAURI = 20.55 cm    Fetal Heart Rate:  138 bpm    Cervical Length:  4.67 cm transabdominal    No maternal adnexal mass is identified.    Umbilical Artery S/D Ratio(s):  Not applicable    Fetal Anatomy  (Seen or Not Seen)  Lateral Ventricles     Seen  Cisterna Magna        Seen  Cerebellum              Seen  CSP             Seen  Orbits             Seen  Face/Lips                Seen  Cord Insertion         Seen  Placental CI         Seen  4 Chamber Heart     Seen  LVOT               Seen  RVOT              Seen  Stomach       Seen  Kidneys                   Seen  Urinary Bladder      Seen  Spine                       Seen  3 Vessel Cord          Seen  Both Upper Extremities    Seen  Both Lower Extremities    Seen  Diaphragm             Seen  Movement       Seen  Gender:  Likely female    Fetal Biometry  BPD    4.96 cm, 21 weeks  HC    18.39 cm, 20 weeks, 5 days  AC    16.09 cm, 21 weeks, 1 day  Femur Length    3.65 cm, 21 weeks, 4 days  Humerus Length    3.25 cm, 21 weeks  Cerebellum Diameter   2.13 cm    EGA by this US:  21 weeks, 1 day  MELIDA by this US: 2018  MELIDA by 1st US:  2018 by MD    Estimated Fetal Weight:  414 grams    Comments:   Impression       1.  Single intrauterine pregnancy of an estimated gestational age of 21 weeks, 1 day with an estimated date of delivery of 2018.  2.  Fetal  survey within normal limits.         A:  IUP at 23w2d  Patient Active Problem List    Diagnosis Date Noted   • History of PTD at 36wk x 1 2018   • Encounter for supervision of normal pregnancy in second trimester 2017       P:  1. Questions answered.           2. Encouraged adequate water intake        3.   labor precautions reviewed.        4.  F/u 4 wks.        5.  Reviewed US results w pt.         6.  28wk labs ordered.

## 2018-08-30 ENCOUNTER — HOSPITAL ENCOUNTER (OUTPATIENT)
Dept: LAB | Facility: MEDICAL CENTER | Age: 33
End: 2018-08-30
Attending: NURSE PRACTITIONER
Payer: COMMERCIAL

## 2018-08-30 DIAGNOSIS — Z34.92 ENCOUNTER FOR SUPERVISION OF NORMAL PREGNANCY IN SECOND TRIMESTER, UNSPECIFIED GRAVIDITY: ICD-10-CM

## 2018-08-30 LAB
GLUCOSE 1H P 50 G GLC PO SERPL-MCNC: 106 MG/DL (ref 70–139)
HCT VFR BLD AUTO: 39.3 % (ref 37–47)
HGB BLD-MCNC: 13 G/DL (ref 12–16)
TREPONEMA PALLIDUM IGG+IGM AB [PRESENCE] IN SERUM OR PLASMA BY IMMUNOASSAY: NON REACTIVE

## 2018-09-04 ENCOUNTER — TELEPHONE (OUTPATIENT)
Dept: OBGYN | Facility: CLINIC | Age: 33
End: 2018-09-04

## 2018-09-04 NOTE — TELEPHONE ENCOUNTER
Pt c/o pain on L leg and gluteus, wants to see what she can use or take for pain.    Denies other complications.  Advised to do some leg stretching, warm compresses, tylenol for pain.  Verbalized understanding.

## 2018-09-12 ENCOUNTER — ROUTINE PRENATAL (OUTPATIENT)
Dept: OBGYN | Facility: CLINIC | Age: 33
End: 2018-09-12

## 2018-09-12 VITALS — SYSTOLIC BLOOD PRESSURE: 108 MMHG | WEIGHT: 175 LBS | DIASTOLIC BLOOD PRESSURE: 52 MMHG | BODY MASS INDEX: 31 KG/M2

## 2018-09-12 DIAGNOSIS — O09.892 HISTORY OF PRETERM DELIVERY, CURRENTLY PREGNANT IN SECOND TRIMESTER: ICD-10-CM

## 2018-09-12 DIAGNOSIS — Z34.82 ENCOUNTER FOR SUPERVISION OF OTHER NORMAL PREGNANCY IN SECOND TRIMESTER: Primary | ICD-10-CM

## 2018-09-12 PROCEDURE — 90040 PR PRENATAL FOLLOW UP: CPT | Performed by: NURSE PRACTITIONER

## 2018-09-12 PROCEDURE — 90471 IMMUNIZATION ADMIN: CPT | Performed by: NURSE PRACTITIONER

## 2018-09-12 PROCEDURE — 90715 TDAP VACCINE 7 YRS/> IM: CPT | Performed by: NURSE PRACTITIONER

## 2018-09-12 NOTE — PROGRESS NOTES
S:  Pt is  at 27w2d for routine OB follow up.  Reports some left ear pain since last week.  Also reports some sciatic pain.  Reports good FM.  Denies VB, LOF, RUCs or vaginal DC.    O:  Please see above vitals.        FHTs: 150        Fundal ht: 27 cm.        Ears WNL BL.     A:  IUP at 27w2d  Patient Active Problem List    Diagnosis Date Noted   • History of PTD at 36wk x 1 2018   • Encounter for supervision of normal pregnancy in second trimester 2017        P:  1.  PP contraception pills.  Declines BTL.         2.  Instructions given on FKCs.          3.  Questions answered.          4.  Encouraged pt to tour L&D.          5.  Encourage adequate water intake.        6.  1hr GTT, syphilis and H/H wnl - reviewed w pt.        7.   labor precautions reviewed.         8.  F/u 2 wks.        9.  TDap given.      10.  D/w pt helps for back pain.      I have placed the below orders and discussed them with an approved delegating provider. The MA is performing the below orders under the direction of  Dr. Healy.

## 2018-09-12 NOTE — PATIENT INSTRUCTIONS
P:  1.  PP contraception pills.  Declines BTL.         2.  Instructions given on FKCs.          3.  Questions answered.          4.  Encouraged pt to tour L&D.          5.  Encourage adequate water intake.        6.  1hr GTT, syphilis and H/H wnl - reviewed w pt.        7.   labor precautions reviewed.         8.  F/u 2 wks.        9.  TDap given.      10.  D/w pt helps for back pain.

## 2018-09-12 NOTE — LETTER
Cuente los Movimientos de pimentel Bebé  Otro paso importante para la rohan de pimentel bebé    Marietta Belia Paris     [facility]            Dept: [phone]    ¿Cuántas semanas tiene de embarazo? 27w2d    Fecha cuando tiene que comenzar a contar el movimiento: 09/17/2018                  [place] debe usar clinton diagrama    Rosy manera en que pimentel doctor puede controlar a rohan de pimentel bebé es sabiendo cuantas veces se mueve pimentel bebé en el útero, o por medio de las “pataditas”.  Usted podrá ayudarle a pimentel médico al usar cada día el siguiente diagrama.    Cada día, usted debe prestar atención a cuantas horas le lleva a pimentel bebé moverse 10 veces.  Comience a contar en la mañana, lo antes posible después de haberse levantado.    · Primeramente, escriba la hora en que se mueve pimentel bebé, hasta llegar a 10 veces.  · Colóquele un check o palomita a cada cuadrito cada vez que pimentel bebé se mueva hasta que complete 10 veces.  · Escriba la hora cuando termine de contar 10 veces en la última columna.  · Sume el total del tiempo que le llevó contar los 10 movimientos.  · Finalmente, complete el cuadrito de cuantas horas le llevó hacerlo.    Después de char contado los 10 movimientos, ya no tendrá que contar los demás movimientos por el kezia del día.  A la mañana siguiente, comience a contar de nuevo cuantas veces se mueve el bebé desde el momento en que se levante.    ¿Qué tendría que considerarse un “movimiento”?  Es difícil de decirlo porque es distinto de roys madre a otra, y de un embarazo a otro.  Lo importante es que cuente el movimiento de la misma manera samanta el transcurso de pimentel embarazo.  Si tiene preguntas adicionales, pregúntele a pimentel doctor.    ¡Cuente cuidadosamente cada día!     MUESTRA:  Semana 28    ¿Cuántas horas le ha llevado sentir 10 movimientos?        Hora de Inicio     1     2     3     4     5     6     7     8     9     10   Hora de Finlizar   Ronan. 8:20 ·  ·  ·  ·  ·  ·  ·  ·  ·  ·  11:40   Mar.                Mié.               Jue.               Vie.               Sáb.               Dom.                 IMPORTANTE:  Usted debe contactar a pimentel doctor si le lleva más de 2 horas sentir 10 movimientos de pimentel bebé.    Cada mañana, escriba la hora de inicio y comience a contar los movimientos de pimentel bebé.  Hágalo colocándole un check o palomita a cada cuadrito cada vez que sienta un movimiento de pimentel bebé.  Cuando haya sentido 10 “pataditas”, escriba la hora en que terminó de contar en la última columna.  Luego, complete en la cajita (arriba de la hayde de check o palomita) el número total de horas que le llevó hacerlo.  Asegúrese de leer completamente las instrucciones en la página anterior.

## 2018-09-26 ENCOUNTER — ROUTINE PRENATAL (OUTPATIENT)
Dept: OBGYN | Facility: CLINIC | Age: 33
End: 2018-09-26

## 2018-09-26 VITALS — SYSTOLIC BLOOD PRESSURE: 98 MMHG | WEIGHT: 178 LBS | DIASTOLIC BLOOD PRESSURE: 72 MMHG | BODY MASS INDEX: 31.53 KG/M2

## 2018-09-26 DIAGNOSIS — Z34.82 ENCOUNTER FOR SUPERVISION OF OTHER NORMAL PREGNANCY IN SECOND TRIMESTER: ICD-10-CM

## 2018-09-26 PROCEDURE — 90040 PR PRENATAL FOLLOW UP: CPT | Performed by: PHYSICIAN ASSISTANT

## 2018-09-26 NOTE — PROGRESS NOTES
Pt has no complaints with cramping, bleeding or pain, though pt has had incr low back discomfort. Pt to try stretching, warm packs, massage and Tylenol prn. +FM. TDaP given last visit. RTC 2 wk or sooner prn.

## 2018-09-26 NOTE — PROGRESS NOTES
Pt here today for OB follow up  Pt states no complaints  Reports +  Good # [phone]  Pharmacy Confirmed.  Chaperone offered and none needed.

## 2018-10-10 ENCOUNTER — ROUTINE PRENATAL (OUTPATIENT)
Dept: OBGYN | Facility: CLINIC | Age: 33
End: 2018-10-10

## 2018-10-10 VITALS — BODY MASS INDEX: 31 KG/M2 | DIASTOLIC BLOOD PRESSURE: 58 MMHG | WEIGHT: 175 LBS | SYSTOLIC BLOOD PRESSURE: 100 MMHG

## 2018-10-10 DIAGNOSIS — Z34.82 ENCOUNTER FOR SUPERVISION OF OTHER NORMAL PREGNANCY IN SECOND TRIMESTER: Primary | ICD-10-CM

## 2018-10-10 DIAGNOSIS — Z23 NEED FOR PROPHYLACTIC VACCINATION AND INOCULATION AGAINST INFLUENZA: ICD-10-CM

## 2018-10-10 PROCEDURE — 90471 IMMUNIZATION ADMIN: CPT | Performed by: NURSE PRACTITIONER

## 2018-10-10 PROCEDURE — 90040 PR PRENATAL FOLLOW UP: CPT | Performed by: NURSE PRACTITIONER

## 2018-10-10 PROCEDURE — 90686 IIV4 VACC NO PRSV 0.5 ML IM: CPT | Performed by: NURSE PRACTITIONER

## 2018-10-10 NOTE — PROGRESS NOTES
S:  Pt is  at 31w2d for routine OB follow up.  No c/o today.  Reports good FM.  Denies VB, LOF, RUCs or vaginal DC.    O:  Please see above vitals.        FHTs: 138        Fundal ht: 31 cm.    A:  IUP at 31w2d  Patient Active Problem List    Diagnosis Date Noted   • History of PTD at 36wk x 1 2018   • Encounter for supervision of normal pregnancy in second trimester 2017        P:  1.  GBS @ 35 wks.          2.  Continue FKCs.          3.  Questions answered.          4.  Encouraged pt to tour L&D.          5.  Encourage adequate water intake.        6.  F/u 2 wks.        7.  PP contraception pills.        8.  Flu vaccine given.    I have placed the below orders and discussed them with an approved delegating provider. The MA is performing the below orders under the direction of  Dr. Monahan.

## 2018-10-10 NOTE — PROGRESS NOTES
Pt here today for OB follow up  Reports +FM  WT:  175 lb  BP: 100/58  Pt states no complaints today  Desires influenza vaccine  Good # [phone]    Influenza vaccine given. Right Deltoid. VIS given and screening check list reviewed with pt.   Influenza vaccine verified by Darcie Maloney C.N.M.

## 2018-10-10 NOTE — PATIENT INSTRUCTIONS
P:  1.  GBS @ 35 wks.          2.  Continue FKCs.          3.  Questions answered.          4.  Encouraged pt to tour L&D.          5.  Encourage adequate water intake.        6.  F/u 2 wks.        7.  PP contraception pills.        8.  Flu vaccine given.

## 2018-10-25 ENCOUNTER — ROUTINE PRENATAL (OUTPATIENT)
Dept: OBGYN | Facility: CLINIC | Age: 33
End: 2018-10-25

## 2018-10-25 VITALS — WEIGHT: 176 LBS | SYSTOLIC BLOOD PRESSURE: 98 MMHG | DIASTOLIC BLOOD PRESSURE: 62 MMHG | BODY MASS INDEX: 31.18 KG/M2

## 2018-10-25 DIAGNOSIS — Z34.80 SUPERVISION OF OTHER NORMAL PREGNANCY, ANTEPARTUM: ICD-10-CM

## 2018-10-25 PROCEDURE — 90040 PR PRENATAL FOLLOW UP: CPT | Performed by: NURSE PRACTITIONER

## 2018-10-25 NOTE — PROGRESS NOTES
SUBJECTIVE:  Pt is a 33 y.o.   at 33w3d  gestation. Presents today for follow-up prenatal care. Reports no issues at this time.  Reports feeling good  fetal movement. Denies frequent cramping/contractions. No bleeding or leaking of fluid. Denies dysuria, headaches, N/V, or other issues at this time. Generally feels well today.     OBJECTIVE:  - See prenatal vitals flow  -   Vitals:    10/25/18 0823   BP: (!) 98/62   Weight: 79.8 kg (176 lb)      Labs - normal pnp, normal AFP, normal glucose.   US - normal fetal survey            ASSESSMENT:   - IUP at 33w3d    - S=D   -   Patient Active Problem List    Diagnosis Date Noted   • History of PTD at 36wk x 1 2018   • Encounter for supervision of normal pregnancy in second trimester 2017         PLAN:  - S/sx pregnancy and labor warning signs vs general discomforts discussed  - Fetal movements and kick counts reviewed   - Adequate hydration reinforced  - Nutrition/exercise/vitamin education; continued PNV  - Encouraged tour of LnD/childbirth education classes: contact info provided   - Plans either pills or condoms PP

## 2018-10-25 NOTE — PROGRESS NOTES
Pt here today for OB follow up  Pt states having slight cramping   Reports +FM   Good # [phone]  Pharmacy Confirmed.  Chaperone offered and not indicated.

## 2018-11-07 ENCOUNTER — ROUTINE PRENATAL (OUTPATIENT)
Dept: OBGYN | Facility: CLINIC | Age: 33
End: 2018-11-07

## 2018-11-07 ENCOUNTER — HOSPITAL ENCOUNTER (OUTPATIENT)
Facility: MEDICAL CENTER | Age: 33
End: 2018-11-07
Attending: OBSTETRICS & GYNECOLOGY
Payer: COMMERCIAL

## 2018-11-07 VITALS — BODY MASS INDEX: 31.35 KG/M2 | DIASTOLIC BLOOD PRESSURE: 66 MMHG | WEIGHT: 177 LBS | SYSTOLIC BLOOD PRESSURE: 100 MMHG

## 2018-11-07 DIAGNOSIS — O09.892 HISTORY OF PRETERM DELIVERY, CURRENTLY PREGNANT IN SECOND TRIMESTER: ICD-10-CM

## 2018-11-07 PROCEDURE — 90040 PR PRENATAL FOLLOW UP: CPT | Performed by: OBSTETRICS & GYNECOLOGY

## 2018-11-07 NOTE — PROGRESS NOTES
Pt here today for OB follow up  Pt states having slight cramping and back pain   Reports +FM  Good # [phone]  Pharmacy Confirmed.  Chaperone offered and not indicated.   GBS today.

## 2018-11-07 NOTE — PROGRESS NOTES
33 y.o.  35w2d The patient is here for routine obstetrical followup. She reports good fetal movement. She denies contractions, vaginal bleeding, or leaking of fluid.    The patient's pregnancy is complicated by   Patient Active Problem List    Diagnosis Date Noted   • History of PTD at 36wk x 1 2018   • Encounter for supervision of normal pregnancy in second trimester 2017     GBS done    Followup in 1 week   labor precautions were discussed with patient  Fetal kick counts were discussed with patient

## 2018-11-09 LAB — GP B STREP DNA SPEC QL NAA+PROBE: NEGATIVE

## 2018-11-15 ENCOUNTER — ROUTINE PRENATAL (OUTPATIENT)
Dept: OBGYN | Facility: CLINIC | Age: 33
End: 2018-11-15

## 2018-11-15 VITALS — BODY MASS INDEX: 31.89 KG/M2 | SYSTOLIC BLOOD PRESSURE: 106 MMHG | WEIGHT: 180 LBS | DIASTOLIC BLOOD PRESSURE: 64 MMHG

## 2018-11-15 DIAGNOSIS — Z34.82 ENCOUNTER FOR SUPERVISION OF OTHER NORMAL PREGNANCY IN SECOND TRIMESTER: Primary | ICD-10-CM

## 2018-11-15 PROCEDURE — 90040 PR PRENATAL FOLLOW UP: CPT | Performed by: NURSE PRACTITIONER

## 2018-11-15 NOTE — PROGRESS NOTES
S) Pt is a 33 y.o.   at 36w3d  gestation. Routine prenatal care today. Pt without complaints or concerns today.    Fetal movement Normal  Cramping no  VB no  LOF no   Denies dysuria. Generally feels well today. Good self-care activities identified. Denies headaches, swelling, visual changes, or epigastric pain .     O) Blood pressure 106/64, weight 81.6 kg (180 lb), last menstrual period 2018, unknown if currently breastfeeding.        Labs: WNL       PNL: WNL       GCT:106       AFP: normal       GBS: negative       Pertinent ultrasound - 18 LAURI 20.55 survey WNL, c/w previous dating           A) IUP at 36w3d       S=D         Patient Active Problem List    Diagnosis Date Noted   • History of PTD at 36wk x 1 2018   • Encounter for supervision of normal pregnancy in second trimester 2017          SVE: deferred         TDAP: yes       FLU: yes        BTL: no       : no       C/S Consent: no       IOL or C/S scheduled: no       LAST PAP: 18 negative         P) s/s ptl vs general discomforts.   Fetal movements reviewed. General ed and anticipatory guidance. Nutrition/exercise/vitamin. Plans breast Plans pp contraception- Pill   Discussed IOL policy after 41 weeks.  Discussed GBS neg lab result.  RTC 1 week or PRN

## 2018-11-15 NOTE — PROGRESS NOTES
Pt here today for OB follow up  Negative GBS, Pt aware  Reports +FM  WT: 180 lb  BP: 106/64  Pt states no complaints today  Good # [phone]

## 2018-11-26 ENCOUNTER — ROUTINE PRENATAL (OUTPATIENT)
Dept: OBGYN | Facility: CLINIC | Age: 33
End: 2018-11-26

## 2018-11-26 VITALS — BODY MASS INDEX: 31.71 KG/M2 | SYSTOLIC BLOOD PRESSURE: 102 MMHG | DIASTOLIC BLOOD PRESSURE: 62 MMHG | WEIGHT: 179 LBS

## 2018-11-26 DIAGNOSIS — Z34.83 ENCOUNTER FOR SUPERVISION OF OTHER NORMAL PREGNANCY, THIRD TRIMESTER: ICD-10-CM

## 2018-11-26 PROCEDURE — 90040 PR PRENATAL FOLLOW UP: CPT | Performed by: NURSE PRACTITIONER

## 2018-11-26 NOTE — PROGRESS NOTES
SUBJECTIVE:  Pt is a 33 y.o.   at 38w0d  gestation. Presents today for follow-up prenatal care. Reports no issues at this time.  Reports good  fetal movement. Denies cramping/contractions, bleeding or leaking of fluid. Denies dysuria, headaches, N/V, or other issues at this time. Generally feels well today.     OBJECTIVE:  - See prenatal vitals flow  -   Vitals:    18 0826   BP: 102/62   Weight: 81.2 kg (179 lb)      Labs - normal prenatal labs  US - normal fetal survey            ASSESSMENT:   - IUP at 38w0d    - S=D   -   Patient Active Problem List    Diagnosis Date Noted   • History of PTD at 36wk x 1 2018   • Encounter for supervision of normal pregnancy in second trimester 2017         PLAN:  - S/sx pregnancy and labor warning signs vs general discomforts discussed  - Fetal movements and kick counts reviewed   - Adequate hydration reinforced  - Nutrition/exercise/vitamin education; continued PNV  -Plans OCP postpartum  Order placed for induction of labor to be scheduled in absence of spontaneous labor.

## 2018-11-26 NOTE — PROGRESS NOTES
Pt here today for OB follow up  Pt states no complaints   Reports +FM   Good # [phone]  Pharmacy Confirmed.  Chaperone offered and declined.   GBS negative.

## 2018-12-03 ENCOUNTER — ROUTINE PRENATAL (OUTPATIENT)
Dept: OBGYN | Facility: CLINIC | Age: 33
End: 2018-12-03

## 2018-12-03 VITALS — BODY MASS INDEX: 31.89 KG/M2 | WEIGHT: 180 LBS | DIASTOLIC BLOOD PRESSURE: 58 MMHG | SYSTOLIC BLOOD PRESSURE: 106 MMHG

## 2018-12-03 DIAGNOSIS — Z34.83 ENCOUNTER FOR SUPERVISION OF OTHER NORMAL PREGNANCY, THIRD TRIMESTER: ICD-10-CM

## 2018-12-03 PROCEDURE — 90040 PR PRENATAL FOLLOW UP: CPT | Performed by: NURSE PRACTITIONER

## 2018-12-03 NOTE — PROGRESS NOTES
SUBJECTIVE:  Pt is a 33 y.o.   at 39w0d  gestation. Presents today for follow-up prenatal care. Reports no issues at this time.  Reports good  fetal movement. Denies cramping/contractions, bleeding or leaking of fluid. Denies dysuria, headaches, N/V, or other issues at this time. Generally feels well today.     OBJECTIVE:  - See prenatal vitals flow  -   Vitals:    18 0856   BP: 106/58   Weight: 81.6 kg (180 lb)      Labs - normal prenatal labs  US normal fetal survey              ASSESSMENT:   - IUP at 39w0d    - S=D   -   Patient Active Problem List    Diagnosis Date Noted   • History of PTD at 36wk x 1 2018   • Encounter for supervision of normal pregnancy in second trimester 2017         PLAN:  - S/sx pregnancy and labor warning signs vs general discomforts discussed  - Fetal movements and kick counts reviewed   - Adequate hydration reinforced  - Nutrition/exercise/vitamin education; continued PNV   Patient understands induction of labor instructions.

## 2018-12-03 NOTE — PROGRESS NOTES
Pt here today for OB follow up  Negative GBS, pt aware  Reports +FM  WT: 180 lb  BP: 106/58  Pt states she has bee having irregular contractions. States no other concerns.   Pt scheduled for IOL on Sunday 12/16/18 at 0800. Pt notified and instructions given today.  Good # [phone]

## 2018-12-04 ENCOUNTER — HOSPITAL ENCOUNTER (INPATIENT)
Facility: MEDICAL CENTER | Age: 33
LOS: 3 days | End: 2018-12-07
Attending: OBSTETRICS & GYNECOLOGY | Admitting: OBSTETRICS & GYNECOLOGY
Payer: MEDICAID

## 2018-12-04 LAB
BASOPHILS # BLD AUTO: 0.3 % (ref 0–1.8)
BASOPHILS # BLD: 0.04 K/UL (ref 0–0.12)
EOSINOPHIL # BLD AUTO: 0.04 K/UL (ref 0–0.51)
EOSINOPHIL NFR BLD: 0.3 % (ref 0–6.9)
ERYTHROCYTE [DISTWIDTH] IN BLOOD BY AUTOMATED COUNT: 45.2 FL (ref 35.9–50)
ERYTHROCYTE [DISTWIDTH] IN BLOOD BY AUTOMATED COUNT: 45.3 FL (ref 35.9–50)
HCT VFR BLD AUTO: 28.1 % (ref 37–47)
HCT VFR BLD AUTO: 38.7 % (ref 37–47)
HGB BLD-MCNC: 10 G/DL (ref 12–16)
HGB BLD-MCNC: 13 G/DL (ref 12–16)
HOLDING TUBE BB 8507: NORMAL
IMM GRANULOCYTES # BLD AUTO: 0.06 K/UL (ref 0–0.11)
IMM GRANULOCYTES NFR BLD AUTO: 0.4 % (ref 0–0.9)
LYMPHOCYTES # BLD AUTO: 2.3 K/UL (ref 1–4.8)
LYMPHOCYTES NFR BLD: 17 % (ref 22–41)
MCH RBC QN AUTO: 31 PG (ref 27–33)
MCH RBC QN AUTO: 32.8 PG (ref 27–33)
MCHC RBC AUTO-ENTMCNC: 33.6 G/DL (ref 33.6–35)
MCHC RBC AUTO-ENTMCNC: 35.6 G/DL (ref 33.6–35)
MCV RBC AUTO: 92.1 FL (ref 81.4–97.8)
MCV RBC AUTO: 92.4 FL (ref 81.4–97.8)
MONOCYTES # BLD AUTO: 0.86 K/UL (ref 0–0.85)
MONOCYTES NFR BLD AUTO: 6.4 % (ref 0–13.4)
NEUTROPHILS # BLD AUTO: 10.2 K/UL (ref 2–7.15)
NEUTROPHILS NFR BLD: 75.6 % (ref 44–72)
NRBC # BLD AUTO: 0 K/UL
NRBC BLD-RTO: 0 /100 WBC
PLATELET # BLD AUTO: 171 K/UL (ref 164–446)
PLATELET # BLD AUTO: 229 K/UL (ref 164–446)
PMV BLD AUTO: 10.9 FL (ref 9–12.9)
PMV BLD AUTO: 11.4 FL (ref 9–12.9)
RBC # BLD AUTO: 3.05 M/UL (ref 4.2–5.4)
RBC # BLD AUTO: 4.19 M/UL (ref 4.2–5.4)
WBC # BLD AUTO: 13.5 K/UL (ref 4.8–10.8)
WBC # BLD AUTO: 16.9 K/UL (ref 4.8–10.8)

## 2018-12-04 PROCEDURE — 700111 HCHG RX REV CODE 636 W/ 250 OVERRIDE (IP): Performed by: ANESTHESIOLOGY

## 2018-12-04 PROCEDURE — 306828 HCHG ANES-TIME GENERAL: Performed by: OBSTETRICS & GYNECOLOGY

## 2018-12-04 PROCEDURE — 59514 CESAREAN DELIVERY ONLY: CPT

## 2018-12-04 PROCEDURE — 700102 HCHG RX REV CODE 250 W/ 637 OVERRIDE(OP): Performed by: ANESTHESIOLOGY

## 2018-12-04 PROCEDURE — 700105 HCHG RX REV CODE 258: Performed by: OBSTETRICS & GYNECOLOGY

## 2018-12-04 PROCEDURE — 304964 HCHG RECOVERY ROOM TIME 1HR: Performed by: OBSTETRICS & GYNECOLOGY

## 2018-12-04 PROCEDURE — 85025 COMPLETE CBC W/AUTO DIFF WBC: CPT

## 2018-12-04 PROCEDURE — 36415 COLL VENOUS BLD VENIPUNCTURE: CPT

## 2018-12-04 PROCEDURE — 700111 HCHG RX REV CODE 636 W/ 250 OVERRIDE (IP)

## 2018-12-04 PROCEDURE — 500429 HCHG DRESSING, INTERCEED

## 2018-12-04 PROCEDURE — 700105 HCHG RX REV CODE 258: Performed by: ANESTHESIOLOGY

## 2018-12-04 PROCEDURE — 59514 CESAREAN DELIVERY ONLY: CPT | Performed by: OBSTETRICS & GYNECOLOGY

## 2018-12-04 PROCEDURE — 700102 HCHG RX REV CODE 250 W/ 637 OVERRIDE(OP)

## 2018-12-04 PROCEDURE — 700105 HCHG RX REV CODE 258

## 2018-12-04 PROCEDURE — 304966 HCHG RECOVERY SVSC TIME ADDL 1/2 HR: Performed by: OBSTETRICS & GYNECOLOGY

## 2018-12-04 PROCEDURE — 770002 HCHG ROOM/CARE - OB PRIVATE (112)

## 2018-12-04 PROCEDURE — A9270 NON-COVERED ITEM OR SERVICE: HCPCS | Performed by: ANESTHESIOLOGY

## 2018-12-04 PROCEDURE — 305385 HCHG SURGICAL SERVICES 1/4 HOUR: Performed by: OBSTETRICS & GYNECOLOGY

## 2018-12-04 PROCEDURE — 85027 COMPLETE CBC AUTOMATED: CPT

## 2018-12-04 PROCEDURE — 700101 HCHG RX REV CODE 250

## 2018-12-04 RX ORDER — METHYLERGONOVINE MALEATE 0.2 MG/ML
0.2 INJECTION INTRAVENOUS
Status: DISCONTINUED | OUTPATIENT
Start: 2018-12-04 | End: 2018-12-07 | Stop reason: HOSPADM

## 2018-12-04 RX ORDER — ONDANSETRON 2 MG/ML
4 INJECTION INTRAMUSCULAR; INTRAVENOUS EVERY 6 HOURS PRN
Status: DISCONTINUED | OUTPATIENT
Start: 2018-12-04 | End: 2018-12-05

## 2018-12-04 RX ORDER — SODIUM CHLORIDE, SODIUM LACTATE, POTASSIUM CHLORIDE, CALCIUM CHLORIDE 600; 310; 30; 20 MG/100ML; MG/100ML; MG/100ML; MG/100ML
INJECTION, SOLUTION INTRAVENOUS
Status: COMPLETED
Start: 2018-12-04 | End: 2018-12-04

## 2018-12-04 RX ORDER — MISOPROSTOL 200 UG/1
800 TABLET ORAL
Status: DISCONTINUED | OUTPATIENT
Start: 2018-12-04 | End: 2018-12-07 | Stop reason: HOSPADM

## 2018-12-04 RX ORDER — OXYCODONE HYDROCHLORIDE 5 MG/1
5 TABLET ORAL EVERY 4 HOURS PRN
Status: DISCONTINUED | OUTPATIENT
Start: 2018-12-04 | End: 2018-12-05

## 2018-12-04 RX ORDER — ACETAMINOPHEN 500 MG
1000 TABLET ORAL EVERY 6 HOURS
Status: COMPLETED | OUTPATIENT
Start: 2018-12-04 | End: 2018-12-05

## 2018-12-04 RX ORDER — ACETAMINOPHEN 500 MG
1000 TABLET ORAL EVERY 6 HOURS
Status: CANCELLED | OUTPATIENT
Start: 2018-12-04 | End: 2018-12-05

## 2018-12-04 RX ORDER — MISOPROSTOL 200 UG/1
600 TABLET ORAL
Status: DISCONTINUED | OUTPATIENT
Start: 2018-12-04 | End: 2018-12-07 | Stop reason: HOSPADM

## 2018-12-04 RX ORDER — SODIUM CHLORIDE, SODIUM GLUCONATE, SODIUM ACETATE, POTASSIUM CHLORIDE AND MAGNESIUM CHLORIDE 526; 502; 368; 37; 30 MG/100ML; MG/100ML; MG/100ML; MG/100ML; MG/100ML
INJECTION, SOLUTION INTRAVENOUS
Status: COMPLETED
Start: 2018-12-04 | End: 2018-12-04

## 2018-12-04 RX ORDER — CARBOPROST TROMETHAMINE 250 UG/ML
250 INJECTION, SOLUTION INTRAMUSCULAR
Status: DISCONTINUED | OUTPATIENT
Start: 2018-12-04 | End: 2018-12-07 | Stop reason: HOSPADM

## 2018-12-04 RX ORDER — CITRIC ACID/SODIUM CITRATE 334-500MG
30 SOLUTION, ORAL ORAL ONCE
Status: COMPLETED | OUTPATIENT
Start: 2018-12-04 | End: 2018-12-04

## 2018-12-04 RX ORDER — DIPHENHYDRAMINE HYDROCHLORIDE 50 MG/ML
12.5 INJECTION INTRAMUSCULAR; INTRAVENOUS EVERY 6 HOURS PRN
Status: DISCONTINUED | OUTPATIENT
Start: 2018-12-04 | End: 2018-12-05

## 2018-12-04 RX ORDER — DIPHENHYDRAMINE HYDROCHLORIDE 50 MG/ML
12.5 INJECTION INTRAMUSCULAR; INTRAVENOUS
Status: DISCONTINUED | OUTPATIENT
Start: 2018-12-04 | End: 2018-12-04 | Stop reason: HOSPADM

## 2018-12-04 RX ORDER — OXYCODONE HYDROCHLORIDE 10 MG/1
10 TABLET ORAL EVERY 4 HOURS PRN
Status: DISCONTINUED | OUTPATIENT
Start: 2018-12-04 | End: 2018-12-05

## 2018-12-04 RX ORDER — SODIUM CHLORIDE, SODIUM GLUCONATE, SODIUM ACETATE, POTASSIUM CHLORIDE AND MAGNESIUM CHLORIDE 526; 502; 368; 37; 30 MG/100ML; MG/100ML; MG/100ML; MG/100ML; MG/100ML
1500 INJECTION, SOLUTION INTRAVENOUS ONCE
Status: COMPLETED | OUTPATIENT
Start: 2018-12-04 | End: 2018-12-04

## 2018-12-04 RX ORDER — KETOROLAC TROMETHAMINE 30 MG/ML
30 INJECTION, SOLUTION INTRAMUSCULAR; INTRAVENOUS EVERY 6 HOURS
Status: DISCONTINUED | OUTPATIENT
Start: 2018-12-04 | End: 2018-12-05

## 2018-12-04 RX ORDER — ONDANSETRON 2 MG/ML
4 INJECTION INTRAMUSCULAR; INTRAVENOUS
Status: DISCONTINUED | OUTPATIENT
Start: 2018-12-04 | End: 2018-12-04 | Stop reason: HOSPADM

## 2018-12-04 RX ORDER — MEPERIDINE HYDROCHLORIDE 25 MG/ML
6.25 INJECTION INTRAMUSCULAR; INTRAVENOUS; SUBCUTANEOUS
Status: DISCONTINUED | OUTPATIENT
Start: 2018-12-04 | End: 2018-12-04 | Stop reason: HOSPADM

## 2018-12-04 RX ORDER — METOCLOPRAMIDE HYDROCHLORIDE 5 MG/ML
10 INJECTION INTRAMUSCULAR; INTRAVENOUS ONCE
Status: COMPLETED | OUTPATIENT
Start: 2018-12-04 | End: 2018-12-04

## 2018-12-04 RX ORDER — OXYCODONE HYDROCHLORIDE AND ACETAMINOPHEN 5; 325 MG/1; MG/1
2 TABLET ORAL
Status: DISCONTINUED | OUTPATIENT
Start: 2018-12-04 | End: 2018-12-04 | Stop reason: HOSPADM

## 2018-12-04 RX ORDER — SODIUM CHLORIDE, SODIUM LACTATE, POTASSIUM CHLORIDE, CALCIUM CHLORIDE 600; 310; 30; 20 MG/100ML; MG/100ML; MG/100ML; MG/100ML
1000 INJECTION, SOLUTION INTRAVENOUS CONTINUOUS
Status: DISCONTINUED | OUTPATIENT
Start: 2018-12-04 | End: 2018-12-07 | Stop reason: HOSPADM

## 2018-12-04 RX ORDER — HALOPERIDOL 5 MG/ML
1 INJECTION INTRAMUSCULAR
Status: DISCONTINUED | OUTPATIENT
Start: 2018-12-04 | End: 2018-12-04 | Stop reason: HOSPADM

## 2018-12-04 RX ORDER — DIPHENHYDRAMINE HYDROCHLORIDE 50 MG/ML
25 INJECTION INTRAMUSCULAR; INTRAVENOUS EVERY 6 HOURS PRN
Status: DISCONTINUED | OUTPATIENT
Start: 2018-12-04 | End: 2018-12-05

## 2018-12-04 RX ORDER — SODIUM CHLORIDE, SODIUM LACTATE, POTASSIUM CHLORIDE, CALCIUM CHLORIDE 600; 310; 30; 20 MG/100ML; MG/100ML; MG/100ML; MG/100ML
INJECTION, SOLUTION INTRAVENOUS CONTINUOUS
Status: DISCONTINUED | OUTPATIENT
Start: 2018-12-04 | End: 2018-12-07 | Stop reason: HOSPADM

## 2018-12-04 RX ORDER — SODIUM CHLORIDE, SODIUM LACTATE, POTASSIUM CHLORIDE, CALCIUM CHLORIDE 600; 310; 30; 20 MG/100ML; MG/100ML; MG/100ML; MG/100ML
INJECTION, SOLUTION INTRAVENOUS PRN
Status: DISCONTINUED | OUTPATIENT
Start: 2018-12-04 | End: 2018-12-07 | Stop reason: HOSPADM

## 2018-12-04 RX ORDER — OXYCODONE HYDROCHLORIDE AND ACETAMINOPHEN 5; 325 MG/1; MG/1
1 TABLET ORAL
Status: DISCONTINUED | OUTPATIENT
Start: 2018-12-04 | End: 2018-12-04 | Stop reason: HOSPADM

## 2018-12-04 RX ORDER — METOCLOPRAMIDE HYDROCHLORIDE 5 MG/ML
INJECTION INTRAMUSCULAR; INTRAVENOUS
Status: COMPLETED
Start: 2018-12-04 | End: 2018-12-04

## 2018-12-04 RX ORDER — CITRIC ACID/SODIUM CITRATE 334-500MG
SOLUTION, ORAL ORAL
Status: COMPLETED
Start: 2018-12-04 | End: 2018-12-04

## 2018-12-04 RX ADMIN — SODIUM CHLORIDE, POTASSIUM CHLORIDE, SODIUM LACTATE AND CALCIUM CHLORIDE: 600; 310; 30; 20 INJECTION, SOLUTION INTRAVENOUS at 01:15

## 2018-12-04 RX ADMIN — ACETAMINOPHEN 1000 MG: 500 TABLET ORAL at 08:26

## 2018-12-04 RX ADMIN — KETOROLAC TROMETHAMINE 30 MG: 30 INJECTION, SOLUTION INTRAMUSCULAR at 19:45

## 2018-12-04 RX ADMIN — KETOROLAC TROMETHAMINE 30 MG: 30 INJECTION, SOLUTION INTRAMUSCULAR at 08:26

## 2018-12-04 RX ADMIN — ACETAMINOPHEN 1000 MG: 500 TABLET ORAL at 13:35

## 2018-12-04 RX ADMIN — ACETAMINOPHEN 1000 MG: 500 TABLET ORAL at 19:45

## 2018-12-04 RX ADMIN — METOCLOPRAMIDE HYDROCHLORIDE 10 MG: 5 INJECTION INTRAMUSCULAR; INTRAVENOUS at 01:11

## 2018-12-04 RX ADMIN — SODIUM CITRATE AND CITRIC ACID MONOHYDRATE 30 ML: 500; 334 SOLUTION ORAL at 01:11

## 2018-12-04 RX ADMIN — SODIUM CHLORIDE, POTASSIUM CHLORIDE, SODIUM LACTATE AND CALCIUM CHLORIDE: 600; 310; 30; 20 INJECTION, SOLUTION INTRAVENOUS at 10:27

## 2018-12-04 RX ADMIN — SODIUM CHLORIDE, POTASSIUM CHLORIDE, SODIUM LACTATE AND CALCIUM CHLORIDE 1000 ML: 600; 310; 30; 20 INJECTION, SOLUTION INTRAVENOUS at 10:47

## 2018-12-04 RX ADMIN — Medication 30 ML: at 01:11

## 2018-12-04 RX ADMIN — Medication 125 ML/HR: at 03:31

## 2018-12-04 RX ADMIN — METOCLOPRAMIDE 10 MG: 5 INJECTION, SOLUTION INTRAMUSCULAR; INTRAVENOUS at 01:11

## 2018-12-04 RX ADMIN — FAMOTIDINE 20 MG: 10 INJECTION INTRAVENOUS at 01:11

## 2018-12-04 RX ADMIN — SODIUM CHLORIDE, SODIUM GLUCONATE, SODIUM ACETATE, POTASSIUM CHLORIDE AND MAGNESIUM CHLORIDE 1500 ML: 526; 502; 368; 37; 30 INJECTION, SOLUTION INTRAVENOUS at 01:00

## 2018-12-04 RX ADMIN — SODIUM CHLORIDE, SODIUM LACTATE, POTASSIUM CHLORIDE, CALCIUM CHLORIDE: 600; 310; 30; 20 INJECTION, SOLUTION INTRAVENOUS at 01:15

## 2018-12-04 RX ADMIN — KETOROLAC TROMETHAMINE 30 MG: 30 INJECTION, SOLUTION INTRAMUSCULAR at 13:38

## 2018-12-04 ASSESSMENT — EDINBURGH POSTNATAL DEPRESSION SCALE (EPDS)
I HAVE BEEN SO UNHAPPY THAT I HAVE HAD DIFFICULTY SLEEPING: NOT VERY OFTEN
I HAVE BEEN ANXIOUS OR WORRIED FOR NO GOOD REASON: NO, NOT AT ALL
THE THOUGHT OF HARMING MYSELF HAS OCCURRED TO ME: NEVER
I HAVE BEEN ABLE TO LAUGH AND SEE THE FUNNY SIDE OF THINGS: NOT QUITE SO MUCH NOW
I HAVE LOOKED FORWARD WITH ENJOYMENT TO THINGS: AS MUCH AS I EVER DID
I HAVE BLAMED MYSELF UNNECESSARILY WHEN THINGS WENT WRONG: NO, NEVER
I HAVE FELT SCARED OR PANICKY FOR NO GOOD REASON: NO, NOT AT ALL
THINGS HAVE BEEN GETTING ON TOP OF ME: NO, I HAVE BEEN COPING AS WELL AS EVER
I HAVE BEEN SO UNHAPPY THAT I HAVE BEEN CRYING: NO, NEVER
I HAVE FELT SAD OR MISERABLE: NO, NOT AT ALL

## 2018-12-04 ASSESSMENT — PAIN SCALES - GENERAL
PAINLEVEL_OUTOF10: 0
PAINLEVEL_OUTOF10: 2
PAINLEVEL_OUTOF10: 0
PAINLEVEL_OUTOF10: 0

## 2018-12-04 ASSESSMENT — LIFESTYLE VARIABLES: ALCOHOL_USE: NO

## 2018-12-04 ASSESSMENT — PATIENT HEALTH QUESTIONNAIRE - PHQ9
SUM OF ALL RESPONSES TO PHQ9 QUESTIONS 1 AND 2: 0
1. LITTLE INTEREST OR PLEASURE IN DOING THINGS: NOT AT ALL
2. FEELING DOWN, DEPRESSED, IRRITABLE, OR HOPELESS: NOT AT ALL

## 2018-12-04 NOTE — PROGRESS NOTES
Patient arrived to room S351 at 0350. Report received from YURI Shepard. Educated patient on cuddles system, emergency pull cord, and skylight educational videos. Call light within reach.

## 2018-12-04 NOTE — H&P
History and Physical    Marietta Paris is a 33 y.o. female  at 39w1d who presents for spontaneous rupture of membranes with spontaneous onset of labor. She is alexx q4min. While being checked in triage, fetal flanks and buttocks were palpated as presenting part.    Subjective:   CTXs: positive   Pain: positive  LOF: positive  Vaginal bleeding: negative   Fetal movement: positive    ROS: Pertinent positives documented in HPI and all other systems reviewed & are negative    OB History    Para Term  AB Living   4 2 1 1 1 2   SAB TAB Ectopic Molar Multiple Live Births   1         2      # Outcome Date GA Lbr Christofer/2nd Weight Sex Delivery Anes PTL Lv   4 Current            3 SAB 17 12w0d             Birth Comments: No D&C   2  04 36w0d  3.175 kg (7 lb) F Vag-Spont None Y JESUS      Birth Comments: Pt states started to go into labor.    1 Term 02 40w0d  3.175 kg (7 lb) F Vag-Spont  N JESUS      Birth Comments: No complications          PGYNHx: neg paps,STI testing negative.    No past medical history on file.    No past surgical history on file.      Current Facility-Administered Medications:   •  LACTATED RINGERS IV SOLN, , , ,     Allergies: Patient has no known allergies.    Social History     Social History   • Marital status:      Spouse name: N/A   • Number of children: N/A   • Years of education: N/A     Occupational History   • Not on file.     Social History Main Topics   • Smoking status: Never Smoker   • Smokeless tobacco: Never Used   • Alcohol use Yes      Comment: socially; before pregnancy   • Drug use: Unknown   • Sexual activity: Yes     Partners: Male     Birth control/ protection: Pill      Comment: Plans pills PP.     Other Topics Concern   • Not on file     Social History Narrative   • No narrative on file     1 hour 106    FamHx:   Congenital anomalies denies  Chromosomal disorders denies  Inherited MR denies  Blood dyscrasias  "denies    Prenatal care with [facility] with following problems:  Patient Active Problem List    Diagnosis Date Noted   • History of PTD at 36wk x 1 06/22/2018   • Encounter for supervision of normal pregnancy in second trimester 08/28/2017         Objective:      Height 1.626 m (5' 4\"), weight 81.6 kg (180 lb), last menstrual period 03/05/2018, unknown if currently breastfeeding.    General:   no acute distress, cooperative   Skin:   no rash or abnormalities   HEENT:  PERRLA   Lungs:   CTA bilateral   Heart:   chest is clear without rales or wheezing, no pedal edema, S1, S2 normal, no murmur, regular rate and rhythm   Abdomen:   soft, gravid, NT   EFW:  3700gr   Pelvis:  adequate with gynecoid pelvis   FHT:  140 BPM  Accels yes  Decels no  Variability moderate  Category I   Uterine Size: S=D   Presentations: Breech   Cervix:     Dilation: 4-5    Effacement: 75%    Station:  -3    Consistency: Soft    Position: Middle     Lab Review  Lab:   Blood type: A     Recent Results (from the past 5880 hour(s))   POCT Pregnancy    Collection Time: 04/12/18  9:02 AM   Result Value Ref Range    POC Urine Pregnancy Test POSITIVE Negative    Internal Control Positive Positive     Internal Control Negative Negative    POCT Urinalysis    Collection Time: 06/22/18  8:32 AM   Result Value Ref Range    POC Color  Negative    POC Appearance  Negative    POC Leukocyte Esterase small Negative    POC Nitrites negative Negative    POC Urobiligen  Negative (0.2) mg/dL    POC Protein negative Negative mg/dL    POC Urine PH 5.5 5.0 - 8.0    POC Blood trace Negative    POC Specific Gravity 1.025 <1.005 - >1.030    POC Ketones negative Negative mg/dL    POC Bilirubin  Negative mg/dL    POC Glucose negative Negative mg/dL   THINPREP RFLX HPV ASCUS W/CTNG    Collection Time: 06/22/18  9:09 AM   Result Value Ref Range    Cytology Reg See Path Report     Source Cervical     C. trachomatis by PCR Negative Negative    N. gonorrhoeae by PCR Negative Negative "   PREG CNTR PRENATAL PN    Collection Time: 07/12/18 11:58 AM   Result Value Ref Range    WBC 10.0 4.8 - 10.8 K/uL    RBC 3.98 (L) 4.20 - 5.40 M/uL    Hemoglobin 12.6 12.0 - 16.0 g/dL    Hematocrit 37.4 37.0 - 47.0 %    MCV 94.0 81.4 - 97.8 fL    MCH 31.7 27.0 - 33.0 pg    MCHC 33.7 33.6 - 35.0 g/dL    RDW 45.7 35.9 - 50.0 fL    Platelet Count 221 164 - 446 K/uL    MPV 10.9 9.0 - 12.9 fL    Neutrophils-Polys 76.40 (H) 44.00 - 72.00 %    Lymphocytes 16.70 (L) 22.00 - 41.00 %    Monocytes 5.10 0.00 - 13.40 %    Eosinophils 0.80 0.00 - 6.90 %    Basophils 0.70 0.00 - 1.80 %    Immature Granulocytes 0.30 0.00 - 0.90 %    Nucleated RBC 0.00 /100 WBC    Neutrophils (Absolute) 7.60 (H) 2.00 - 7.15 K/uL    Lymphs (Absolute) 1.66 1.00 - 4.80 K/uL    Monos (Absolute) 0.51 0.00 - 0.85 K/uL    Eos (Absolute) 0.08 0.00 - 0.51 K/uL    Baso (Absolute) 0.07 0.00 - 0.12 K/uL    Immature Granulocytes (abs) 0.03 0.00 - 0.11 K/uL    NRBC (Absolute) 0.00 K/uL    Color Yellow     Character Clear     Specific Gravity 1.017 <1.035    Ph 8.0 5.0 - 8.0    Glucose Negative Negative mg/dL    Ketones Negative Negative mg/dL    Protein Negative Negative mg/dL    Bilirubin Negative Negative    Urobilinogen, Urine 0.2 Negative    Nitrite Negative Negative    Leukocyte Esterase Trace (A) Negative    Occult Blood Negative Negative    Micro Urine Req Microscopic     Hepatitis B Surface Antigen Negative Negative    Rubella IgG Antibody >500.0 IU/mL    Syphilis, Treponemal Qual Non Reactive Non Reactive   AFP TETRA    Collection Time: 07/12/18 11:58 AM   Result Value Ref Range    AFP Value -Eia 52 ng/mL    AFP MOM Value 1.24     Ue3 Value 2.75 ng/mL    Ue3 Mom 1.77     Patient's hCG, 2nd Trimester 04174 IU/L    hCG MoM, 2nd Trimester 0.92     Amanda Value -Eia 170 pg/mL    Amanda Mom Value 1.12     Interpretation Screen Neg     [age]ELIDA [age] yr    Maternal Weight 169.0 lbs.     Gest. Age on Collection Date 18 wks, 3 days     Gestational Age Based  On Ultrasound     Multiple Pregnancy Neri     Race Nonblack     Insulin Dependent Diabetes No     Smoking No     Family Hx NTD No     Family Hx of Aneuploidy No     Specimen See Note     EER Quad, Maternal Serum See Note    HIV AG/AB COMBO ASSAY SCREENING    Collection Time: 07/12/18 11:58 AM   Result Value Ref Range    HIV Ag/Ab Combo Assay Non Reactive Non Reactive   URINE MICROSCOPIC (W/UA)    Collection Time: 07/12/18 11:58 AM   Result Value Ref Range    WBC 2-5 /hpf    RBC 0-2 /hpf    Bacteria Moderate (A) None /hpf    Epithelial Cells Moderate (A) /hpf    Hyaline Cast 3-5 (A) /lpf   OP PRENATAL PANEL-BLOOD BANK    Collection Time: 07/12/18 12:05 PM   Result Value Ref Range    ABO Grouping Only A     Rh Grouping Only POS     Antibody Screen Scrn NEG    GLUCOSE 1HR GESTATIONAL    Collection Time: 08/30/18 11:23 AM   Result Value Ref Range    Glucose, Post Dose 106 70 - 139 mg/dL   HCT    Collection Time: 08/30/18 11:23 AM   Result Value Ref Range    Hematocrit 39.3 37.0 - 47.0 %   HGB    Collection Time: 08/30/18 11:23 AM   Result Value Ref Range    Hemoglobin 13.0 12.0 - 16.0 g/dL   T.PALLIDUM AB EIA    Collection Time: 08/30/18 11:23 AM   Result Value Ref Range    Syphilis, Treponemal Qual Non Reactive Non Reactive   GRP B STREP, BY PCR (SALOMON BROTH)    Collection Time: 11/07/18 10:28 AM   Result Value Ref Range    Strep Gp B DNA PCR Negative Negative        Assessment:   Marietta Paris at 39w1d  Labor status: Active phase labor.  Obstetrical history significant for   Patient Active Problem List    Diagnosis Date Noted   • History of PTD at 36wk x 1 06/22/2018   • Encounter for supervision of normal pregnancy in second trimester 08/28/2017   .      Plan:     Admit to L&D  GBS negative  Fetal monitoring/toco  Patient counseled on possibility of breech vaginal delivery. Discussed she is a good candidate for breech delivery however there is always risk of head entrapment with any breech  delivery. Patient declines the vaginal delivery and wishes to proceed with  section.  Discussed with the patient the risks of  delivery. The risks include pain, infection, bleeding, scarring, damage to other organs in the area of operation. Specifically organs that can be damaged are bowel, bladder, ureters. I also discussed with the patient the risk of wound infection and wound breakdown. We discussed that these risks are greater in people that have diabetes or obesity. I also discussed the risk of emergency blood transfusion during procedure as well as emergency hysterectomy during procedure.    Patient had the opportunity to ask questions regarding procedures. All questions answered to the patient's satisfaction.     #[id number]    Anticipate OR  Anesthesia aware.

## 2018-12-04 NOTE — OR SURGEON
Immediate Post OP Note    PreOp Diagnosis: 39 week IUP, breech presentation, spontaneously rupture in active labor    PostOp Diagnosis: same    Procedure(s):  PRIMARY C SECTION - Wound Class: Clean Contaminated    Surgeon(s):  Raul Terry D.O.    First Assist:   Darcie Maloney CNM    Anesthesiologist/Type of Anesthesia:  Anesthesiologist: Wayne Roger M.D./Spinal    Surgical Staff:  Circulator: Drea Hardy R.N.  Scrub Person: Jayne Raymond  First Assist: Darcie Maloney C.N.M.  L&D Baby  Nurse: Pebbles Hopkins R.N.    Specimens removed if any:  * No specimens in log *    Estimated Blood Loss: 500cc    Findings: viable female infant in the ramone breech position delivered at 0149 am with apgars of 8/9 at 1/5 min respectively. Placenta followed shortly after. Weight 3255grams. 1 nuchal cord which was delivered easily. 3 vessel cord.     Complications: none        12/4/2018 2:27 AM Raul Terry D.O.

## 2018-12-04 NOTE — PROGRESS NOTES
", 39w1d     Pt presents to L&D c/o \"my water broke\" at 0030, clear fluid. Also presents with painful UC. Upon examination of the vagina, gross leaking of clear fluid noted. SVE 5-6/100/-2, possible breech presentation. Dr. Terry called to bedside, U/S confirmation of breech presentation. Pt elects to proceed with C/S, consents signed using  #[id number]. Pre-op procedures started     0149-  delivery viable female, \"Kasea\", APGARs 8/9    0345- Pt transferred to PPU via gurney with infant in arms. Bedside report to Korina OROZCO  "

## 2018-12-04 NOTE — OP REPORT
DATE OF SERVICE:  2018     PREOPERATIVE DIAGNOSES:  1.  Intrauterine pregnancy at 39w1d  2.  Breech fetal presentation  3.  Spontaneously ruptured in active labor     POSTOPERATIVE DIAGNOSES:  1.  Intrauterine pregnancy at 39w1d  2.  same     Procedure(s):  PRIMARY Low transverse  SECTION - Wound Class: Clean Contaminated  TWO layer uterine closure     Surgeon(s):  Raul Terry D.O.     First Assist:   Darcie Maloney CNM     Anesthesiologist/Type of Anesthesia:  Anesthesiologist: Wayne Roger M.D./Spinal     Surgical Staff:  Circulator: Drea Hardy R.N.  Scrub Person: Jayne Raymond  First Assist: Darcie Maloney C.N.M.  L&D Baby  Nurse: Pebblse Hopkins R.N.     Specimens removed if any:  * No specimens in log *     Estimated Blood Loss: 500cc     Findings: viable female infant in the ramone breech position delivered at 0149 am with apgars of 8/9 at 1/5 min respectively. Placenta followed shortly after. Weight 3255grams. 1 nuchal cord which was delivered easily. 3 vessel cord.      Complications: none     PROCEDURE:  After appropriate consents were obtained, the patient was taken to the operating room where spinal  anesthesia was applied without complications.  The patient was then prepped and draped in the usual sterile manner.  Clamp test on the skin verified adequate anesthesia.  A Pfannenstiel incision was made with a scalpel 3cm superior to the pubic symphysis and extended down to the rectus fascia.  The rectus fascia was incised with the scalpel and tented up. The underlying rectus muscle was  from the fascia first inferiorly then superiorly using the gomes scissors.  The rectus muscle was  bluntly in the midline.  The peritoneum was entered bluntly in the midline. The peritoneum incision was extended superiorly and inferiorly with the Metzenbaum scissors with great care to avoid injury to underlying bowel or bladder.  The vesicouterine peritoneum was  tented up and entered with Metzenbaum scissors,  and a bladder flap was created.  Bladder blade was placed.  An incision was made into the lower uterine segment transversely and incision was extended bluntly.  Amniotomy was performed and there was noted to be scant amniotic fluid. The infant was in ramone breech presentation.  The buttocks was delivered followed by the trunk of the body.  Once the scapula was seen, the arms were swept forward into the hysterotomy incision and then the head was delivered without any complications. A Single nuchal cord was noted.  The mouth and nares were suctioned. The cord was doubly clamped and cut and the infant was handed off to awaiting neonatology team.  The placenta was then allowed to spontaneously deliver. The uterus was exteriorized and cleared of clots and debris.  The hysterotomy incision was reapproximated with 1-0 PDS suture in a running non locked fashion. A second layer of the same suture was placed to imbricate the incision creating a 2 layer closure.  Hemostasis was noted.  The tubes and ovaries were examined and noted to be normal. The uterus was returned to the abdomen. The pericolic gutters were examined and any blood clots were removed.  The peritoneum was reapproximated with 3-0 Monocryl suture running.  The rectus muscles were examined and hemostatic. They were re approximated with 3-0 monocryl suture running. The fascia was reapproximated with 0 Vicryl suture running.  The subcutaneous fat was irrigated and any small bleeders were bovied for hemostasis.  The subcutaneous fat was then reapproximated with 3-0 Vicryl suture in an interrupted fashion. The skin was reapproximated with 4-0 Monocryl suture in a running fashion. Steri strips and a dressing were placed.  Sponge, needle, instrument, and lap counts were correct x2.  Patient tolerated the procedure well and went to recovery room in stable condition.        ____________________________________     [facility]  Harrison, DO

## 2018-12-05 PROCEDURE — 700102 HCHG RX REV CODE 250 W/ 637 OVERRIDE(OP): Performed by: OBSTETRICS & GYNECOLOGY

## 2018-12-05 PROCEDURE — 770002 HCHG ROOM/CARE - OB PRIVATE (112)

## 2018-12-05 PROCEDURE — A9270 NON-COVERED ITEM OR SERVICE: HCPCS | Performed by: ANESTHESIOLOGY

## 2018-12-05 PROCEDURE — A9270 NON-COVERED ITEM OR SERVICE: HCPCS | Performed by: OBSTETRICS & GYNECOLOGY

## 2018-12-05 PROCEDURE — 700102 HCHG RX REV CODE 250 W/ 637 OVERRIDE(OP): Performed by: ANESTHESIOLOGY

## 2018-12-05 RX ORDER — ONDANSETRON 2 MG/ML
4 INJECTION INTRAMUSCULAR; INTRAVENOUS EVERY 6 HOURS PRN
Status: DISCONTINUED | OUTPATIENT
Start: 2018-12-05 | End: 2018-12-07 | Stop reason: HOSPADM

## 2018-12-05 RX ORDER — FERROUS SULFATE 325(65) MG
325 TABLET ORAL
Status: DISCONTINUED | OUTPATIENT
Start: 2018-12-05 | End: 2018-12-07 | Stop reason: HOSPADM

## 2018-12-05 RX ORDER — OXYCODONE HYDROCHLORIDE AND ACETAMINOPHEN 5; 325 MG/1; MG/1
2 TABLET ORAL EVERY 4 HOURS PRN
Status: DISCONTINUED | OUTPATIENT
Start: 2018-12-05 | End: 2018-12-07 | Stop reason: HOSPADM

## 2018-12-05 RX ORDER — ONDANSETRON 4 MG/1
4 TABLET, ORALLY DISINTEGRATING ORAL EVERY 6 HOURS PRN
Status: DISCONTINUED | OUTPATIENT
Start: 2018-12-05 | End: 2018-12-07 | Stop reason: HOSPADM

## 2018-12-05 RX ORDER — IBUPROFEN 800 MG/1
800 TABLET ORAL EVERY 8 HOURS PRN
Status: DISCONTINUED | OUTPATIENT
Start: 2018-12-05 | End: 2018-12-07 | Stop reason: HOSPADM

## 2018-12-05 RX ORDER — HYDROCODONE BITARTRATE AND ACETAMINOPHEN 5; 325 MG/1; MG/1
1 TABLET ORAL EVERY 4 HOURS PRN
Status: DISCONTINUED | OUTPATIENT
Start: 2018-12-05 | End: 2018-12-07 | Stop reason: HOSPADM

## 2018-12-05 RX ADMIN — HYDROCODONE BITARTRATE AND ACETAMINOPHEN 1 TABLET: 5; 325 TABLET ORAL at 14:17

## 2018-12-05 RX ADMIN — FERROUS SULFATE TAB 325 MG (65 MG ELEMENTAL FE) 325 MG: 325 (65 FE) TAB at 17:59

## 2018-12-05 RX ADMIN — IBUPROFEN 800 MG: 800 TABLET, FILM COATED ORAL at 17:59

## 2018-12-05 RX ADMIN — HYDROCODONE BITARTRATE AND ACETAMINOPHEN 1 TABLET: 5; 325 TABLET ORAL at 09:03

## 2018-12-05 RX ADMIN — FERROUS SULFATE TAB 325 MG (65 MG ELEMENTAL FE) 325 MG: 325 (65 FE) TAB at 14:19

## 2018-12-05 RX ADMIN — FERROUS SULFATE TAB 325 MG (65 MG ELEMENTAL FE) 325 MG: 325 (65 FE) TAB at 08:50

## 2018-12-05 RX ADMIN — IBUPROFEN 800 MG: 800 TABLET, FILM COATED ORAL at 09:03

## 2018-12-05 RX ADMIN — ACETAMINOPHEN 1000 MG: 500 TABLET ORAL at 02:30

## 2018-12-05 RX ADMIN — HYDROCODONE BITARTRATE AND ACETAMINOPHEN 1 TABLET: 5; 325 TABLET ORAL at 19:48

## 2018-12-05 ASSESSMENT — PAIN SCALES - GENERAL
PAINLEVEL_OUTOF10: 2
PAINLEVEL_OUTOF10: 0
PAINLEVEL_OUTOF10: 6
PAINLEVEL_OUTOF10: 1
PAINLEVEL_OUTOF10: 8
PAINLEVEL_OUTOF10: 0
PAINLEVEL_OUTOF10: 7

## 2018-12-05 NOTE — CONSULTS
"Met with MOB for an initial lactation visit. Interpretation via I-Pad offered, but MOB declined. MOB stated she would rather her teen-age daughter translate for her.  MOB delivered her third child today at 0149.  Infant is approximately 20 hours old.  MOB stated she has decided to do both breast and bottle as she feels infant is not getting enough of breast milk because she is \"not seeing anything come out.\"  Explained to MOB on how infant is the best extractor at removing colostrum from her breasts.  Attempted to hand express colostrum from MOB's breasts and was only able to retrieve 1-2 pinpoint sized drops of colostrum from the right breast and zero drops from the left breast.  MOB's medical history negative for PCOS, Diabetes, Hypertension, and Hypothyroidism.  See Lactation Assessment Flow Sheet for assessment findings.  MOB denied pain with breastfeeding.    Assistance offered with breastfeeding, but MOB declined at this time.  MOB last fed infant Similac formula 20 minutes ago.  MOB informed to seek latch assistance from Primary RN.  MOB also informed Lactation will follow up with her in the morning.    Breastfeeding Plan:  MOB encouraged to put infant to the breasts first at each feeding to stimulate breasts and increase milk supply and then follow up feeding with supplementation, if infant still appears hungry.  MOB encouraged to feed infant on demand per feeding cues and at least 8-12 times in a 24 hour period.    MOB to supplement infant per [employer] supplementation guidelines.  A copy of these guidelines was provided by Primary RN, Yessenia Monsalve, along with instructions on use.  MOB verbalized understanding of these guidelines and denied the need for further instruction.  MOB made aware of the risk to her milk supply if infant is not put to the breast to feed at least 8-12 times in a 24 hour period.    MOB stated has WIC and is seen at the [facility] office on [address] in [place], [place]. MOB " "encouraged to follow up with WIC for lactation assistance post discharge if she is still has concerns regarding her milk supply and/or latching infant onto the breast.    MOB provided with \"Getting To Know Your Baby\" in her preferred language of Kiny[place].    MOB verbalized understanding of all information provided to her and denied having any further questions at this time.  MOB encouraged to call for lactation assistance as needed.    "

## 2018-12-05 NOTE — PROGRESS NOTES
Post Partum Progress Note    Name:   Marietta Paris   Date/Time:  2018 - 6:23 AM  Chief Admitting Dx:  Delivery   delivery indicated due to breech presentation  Delivery Type:   for breech  Post-Op/Post Partum Days #:  1    Subjective:  Abdominal pain: yes  Ambulating:   no  Tolerating liquids:  yes  Tolerating food:  yes common adult  Flatus:   yes  BM:    no  Bleeding:   without any bleeding  Voiding:   yes  Dizziness:   no  Feeding:   both breast and bottle - Similac with iron    Vitals:    18 1500 18 1600 18 1700 18   BP:  109/66  109/60   Pulse: 74 67 82 76   Resp:    Temp:  36.7 °C (98 °F)  36.6 °C (97.9 °F)   TempSrc:  Temporal  Temporal   SpO2: 95% 92% 96% 96%   Weight:       Height:           Exam:  Breast: Tenderness no, Engorged no and Lactating yes  Abdomen: Abdomen soft, non-tender. BS normal. No masses,  No organomegaly  Fundal Tenderness:  yes  Fundus Firm: yes  Incision: bandage dry   Below umbilicus: yes  Perineum: perineum intact  Lochia: moderate  Extremities: Normal extremities, peripheral pulses and reflexes normal    Meds:  Current Facility-Administered Medications   Medication Dose   • ibuprofen (MOTRIN) tablet 800 mg  800 mg   • HYDROcodone-acetaminophen (NORCO) 5-325 MG per tablet 1 Tab  1 Tab   • oxyCODONE-acetaminophen (PERCOCET) 5-325 MG per tablet 2 Tab  2 Tab   • ondansetron (ZOFRAN) syringe/vial injection 4 mg  4 mg    Or   • ondansetron (ZOFRAN ODT) dispertab 4 mg  4 mg   • lactated ringers (LR) infusion     • LR infusion     • oxytocin (PITOCIN) infusion (for postpartum)   mL/hr   • miSOPROStol (CYTOTEC) tablet 800 mcg  800 mcg   • methylergonovine (METHERGINE) injection 0.2 mg  0.2 mg   • carboPROST (HEMABATE) injection 250 mcg  250 mcg   • lactated ringers infusion  1,000 mL   • LR infusion     • miSOPROStol (CYTOTEC) tablet 600 mcg  600 mcg       Labs:   Recent Labs      18   0105  18    1909   WBC  13.5*  16.9*   RBC  4.19*  3.05*   HEMOGLOBIN  13.0  10.0*   HEMATOCRIT  38.7  28.1*   MCV  92.4  92.1   MCH  31.0  32.8   MCHC  33.6  35.6*   RDW  45.2  45.3   PLATELETCT  229  171   MPV  11.4  10.9       Assessment:  Chief Admitting Dx:  Delivery   delivery indicated due to breech presentation  Delivery Type:   for breech  Tubal Ligation:  no    Plan:  Continue routine post partum care.  FeSo4 325 mg     Minh Adan M.D.

## 2018-12-05 NOTE — CARE PLAN
Problem: Altered physiologic condition related to postoperative  delivery  Goal: Patient physiologically stable as evidenced by normal lochia, palpable uterine involution and vital signs within normal limits  Outcome: PROGRESSING AS EXPECTED  Patient stable and resting comfortably. Physical assessment WDL. (See flowsheet). Lab values, and vital signs WDL for postpartum state.    Problem: Potential for postpartum infection related to surgical incision, compromised uterine condition, urinary tract or respiratory compromise  Goal: Patient will be afebrile and free from signs and symptoms of infection  Outcome: PROGRESSING AS EXPECTED  No signs or symptoms of infection.

## 2018-12-06 PROCEDURE — 700102 HCHG RX REV CODE 250 W/ 637 OVERRIDE(OP): Performed by: OBSTETRICS & GYNECOLOGY

## 2018-12-06 PROCEDURE — 770002 HCHG ROOM/CARE - OB PRIVATE (112)

## 2018-12-06 PROCEDURE — A9270 NON-COVERED ITEM OR SERVICE: HCPCS | Performed by: OBSTETRICS & GYNECOLOGY

## 2018-12-06 RX ADMIN — HYDROCODONE BITARTRATE AND ACETAMINOPHEN 1 TABLET: 5; 325 TABLET ORAL at 11:46

## 2018-12-06 RX ADMIN — FERROUS SULFATE TAB 325 MG (65 MG ELEMENTAL FE) 325 MG: 325 (65 FE) TAB at 11:46

## 2018-12-06 RX ADMIN — HYDROCODONE BITARTRATE AND ACETAMINOPHEN 1 TABLET: 5; 325 TABLET ORAL at 02:44

## 2018-12-06 RX ADMIN — IBUPROFEN 800 MG: 800 TABLET, FILM COATED ORAL at 02:44

## 2018-12-06 RX ADMIN — IBUPROFEN 800 MG: 800 TABLET, FILM COATED ORAL at 11:46

## 2018-12-06 RX ADMIN — HYDROCODONE BITARTRATE AND ACETAMINOPHEN 1 TABLET: 5; 325 TABLET ORAL at 19:34

## 2018-12-06 RX ADMIN — IBUPROFEN 800 MG: 800 TABLET, FILM COATED ORAL at 19:35

## 2018-12-06 RX ADMIN — FERROUS SULFATE TAB 325 MG (65 MG ELEMENTAL FE) 325 MG: 325 (65 FE) TAB at 19:35

## 2018-12-06 RX ADMIN — FERROUS SULFATE TAB 325 MG (65 MG ELEMENTAL FE) 325 MG: 325 (65 FE) TAB at 07:30

## 2018-12-06 ASSESSMENT — PAIN SCALES - GENERAL
PAINLEVEL_OUTOF10: 6
PAINLEVEL_OUTOF10: 2
PAINLEVEL_OUTOF10: 3
PAINLEVEL_OUTOF10: 6
PAINLEVEL_OUTOF10: 7

## 2018-12-06 NOTE — PROGRESS NOTES
Post Partum Progress Note    Name:   Marietta Paris   Date/Time:  2018 - 5:32 AM  Chief Admitting Dx:  Delivery   delivery indicated due to breech presentation  Delivery Type:   for breech  Post-Op/Post Partum Days #:  2    Subjective:  Abdominal pain: no  Ambulating:   yes  Tolerating liquids:  yes  Tolerating food:  yes common adult  Flatus:   yes  BM:    no  Bleeding:   without any bleeding  Voiding:   yes  Dizziness:   no  Feeding:   breast    Vitals:    18 1700 18 0800 18   BP:  109/60 (!) 95/51 100/67   Pulse: 82 76 74 92   Resp:    Temp:  36.6 °C (97.9 °F) 37.2 °C (98.9 °F) 36.9 °C (98.5 °F)   TempSrc:  Temporal Temporal Oral   SpO2: 96% 96% 97% 96%   Weight:       Height:           Exam:  Breast: Tenderness no  Abdomen: Abdomen soft, non-tender. BS normal. No masses,  No organomegaly  Fundal Tenderness:  no  Fundus Firm: yes  Incision: dry and intact  Below umbilicus: yes  Perineum: perineum intact  Lochia: mild  Extremities: Normal extremities, peripheral pulses and reflexes normal    Meds:  Current Facility-Administered Medications   Medication Dose   • ibuprofen (MOTRIN) tablet 800 mg  800 mg   • HYDROcodone-acetaminophen (NORCO) 5-325 MG per tablet 1 Tab  1 Tab   • oxyCODONE-acetaminophen (PERCOCET) 5-325 MG per tablet 2 Tab  2 Tab   • ondansetron (ZOFRAN) syringe/vial injection 4 mg  4 mg    Or   • ondansetron (ZOFRAN ODT) dispertab 4 mg  4 mg   • ferrous sulfate tablet 325 mg  325 mg   • lactated ringers (LR) infusion     • LR infusion     • oxytocin (PITOCIN) infusion (for postpartum)   mL/hr   • miSOPROStol (CYTOTEC) tablet 800 mcg  800 mcg   • methylergonovine (METHERGINE) injection 0.2 mg  0.2 mg   • carboPROST (HEMABATE) injection 250 mcg  250 mcg   • lactated ringers infusion  1,000 mL   • LR infusion     • miSOPROStol (CYTOTEC) tablet 600 mcg  600 mcg       Labs:   Recent Labs      18   0105   18   1909   WBC  13.5*  16.9*   RBC  4.19*  3.05*   HEMOGLOBIN  13.0  10.0*   HEMATOCRIT  38.7  28.1*   MCV  92.4  92.1   MCH  31.0  32.8   MCHC  33.6  35.6*   RDW  45.2  45.3   PLATELETCT  229  171   MPV  11.4  10.9       Assessment:  Chief Admitting Dx:  Delivery   delivery indicated due to breech presentation  Delivery Type:   for breech  Tubal Ligation:  no    Plan:  Continue routine post partum care.  Anticipate discharge on POD#3    JHOAN Morris.

## 2018-12-06 NOTE — PROGRESS NOTES
Assessment completed. In [place], POC and discharge instructions provided, all questions answered, verbalized understanding. Patient ambulating around room. Patient will call if pain med intervention needed.

## 2018-12-06 NOTE — CARE PLAN
Problem: Potential for postpartum infection related to surgical incision, compromised uterine condition, urinary tract or respiratory compromise  Goal: Patient will be afebrile and free from signs and symptoms of infection  Outcome: PROGRESSING AS EXPECTED  VSS. No signs or symptoms of infection noted     Problem: Alteration in comfort related to surgical incision and/or after birth pains  Goal: Patient is able to ambulate, care for self and infant with acceptable pain level  Outcome: PROGRESSING AS EXPECTED  Patient self ambulating around room, tolerating well. Caring for self and infant.

## 2018-12-06 NOTE — CARE PLAN
Problem: Potential for postpartum infection related to surgical incision, compromised uterine condition, urinary tract or respiratory compromise  Goal: Patient will be afebrile and free from signs and symptoms of infection  Outcome: PROGRESSING AS EXPECTED  No signs or symptoms of infection noted    Problem: Alteration in comfort related to surgical incision and/or after birth pains  Goal: Patient is able to ambulate, care for self and infant with acceptable pain level  Outcome: PROGRESSING AS EXPECTED  Ambulating well. Taking pain meds with adequate relief as stated by pt.

## 2018-12-06 NOTE — LACTATION NOTE
Met with MOB for a lactation follow up visit.  Offered translation via  #[id number] Jovan, but MOB declined use of .  MOB preferred [employer] translate for her.    Latch assistance offered, but MOB declined.  MOB stated she received latch assistance from Primary RN, Asia Fleming last night and since then, infant has been latching deeper onto the breast.  MOB reported increased comfort with feeds.    Breastfeeding Plan of Care:  MOB encouraged to put infant to the breasts first at each feeding to stimulate breasts and increase milk supply and then follow up feeding with supplementation, if infant still appears hungry.  MOB encouraged to feed infant on demand per feeding cues and at least 8-12 times in a 24 hour period.  Supplementation with formula will be based on volumes listed on the [facility] supplementation guidelines according to infant's age (in hours) and birthweight.    MOB stated she is applying lanolin cream to sore nipples and per MOB, soreness at nipples is decreasing.    MOB remains aware of the outpatient lactation assistance available to her through [facility].    MOB verbalized understanding of all information provided to her and denied having any further questions at this time.  MOB encouraged to call for lactation assistance as needed.

## 2018-12-07 VITALS
HEART RATE: 70 BPM | TEMPERATURE: 98.8 F | SYSTOLIC BLOOD PRESSURE: 104 MMHG | RESPIRATION RATE: 18 BRPM | BODY MASS INDEX: 30.73 KG/M2 | DIASTOLIC BLOOD PRESSURE: 68 MMHG | WEIGHT: 180 LBS | OXYGEN SATURATION: 96 % | HEIGHT: 64 IN

## 2018-12-07 PROCEDURE — A9270 NON-COVERED ITEM OR SERVICE: HCPCS | Performed by: OBSTETRICS & GYNECOLOGY

## 2018-12-07 PROCEDURE — 700102 HCHG RX REV CODE 250 W/ 637 OVERRIDE(OP): Performed by: OBSTETRICS & GYNECOLOGY

## 2018-12-07 RX ORDER — HYDROCODONE BITARTRATE AND ACETAMINOPHEN 5; 325 MG/1; MG/1
1 TABLET ORAL EVERY 6 HOURS PRN
Qty: 10 TAB | Refills: 0 | Status: SHIPPED | OUTPATIENT
Start: 2018-12-07 | End: 2018-12-12

## 2018-12-07 RX ORDER — FERROUS SULFATE 325(65) MG
325 TABLET ORAL
Qty: 90 TAB | Refills: 2 | Status: SHIPPED | OUTPATIENT
Start: 2018-12-07

## 2018-12-07 RX ORDER — IBUPROFEN 800 MG/1
800 TABLET ORAL EVERY 8 HOURS PRN
Qty: 60 TAB | Refills: 0 | Status: SHIPPED | OUTPATIENT
Start: 2018-12-07

## 2018-12-07 RX ORDER — DOCUSATE SODIUM 100 MG/1
100 CAPSULE, LIQUID FILLED ORAL 2 TIMES DAILY
Qty: 60 CAP | Refills: 0 | Status: SHIPPED | OUTPATIENT
Start: 2018-12-07

## 2018-12-07 RX ADMIN — FERROUS SULFATE TAB 325 MG (65 MG ELEMENTAL FE) 325 MG: 325 (65 FE) TAB at 08:30

## 2018-12-07 RX ADMIN — IBUPROFEN 800 MG: 800 TABLET, FILM COATED ORAL at 08:30

## 2018-12-07 ASSESSMENT — PAIN SCALES - GENERAL: PAINLEVEL_OUTOF10: 3

## 2018-12-07 NOTE — PROGRESS NOTES
Assessment completed. Fundus firm, lochia scant. ABD incision with steri strips intact, no signs of infection. In Amharic, POC reviewed, verbalized understanding.

## 2018-12-07 NOTE — DISCHARGE INSTRUCTIONS
POSTPARTUM DISCHARGE INSTRUCTIONS FOR MOM    YOB: 1985   Age: 33 y.o.               Admit Date: 2018     Discharge Date: 2018  Attending Doctor:  Raul Terry D.O.                  Allergies:  Patient has no known allergies.    Discharged to home by car. Discharged via wheelchair, hospital escort: Yes.  Special equipment needed: Not Applicable  Belongings with: Personal  Be sure to schedule a follow-up appointment with your primary care doctor or any specialists as instructed.     Discharge Plan:   Diet Plan: Discussed  Activity Level: Discussed  Confirmed Follow up Appointment: Patient to Call and Schedule Appointment  Confirmed Symptoms Management: Discussed  Medication Reconciliation Updated: Yes  Influenza Vaccine Indication: Not indicated: Previously immunized this influenza season and > 8 years of age    REASONS TO CALL YOUR OBSTETRICIAN:  1.   Persistent fever or shaking chills (Temperature higher than 100.4)  2.   Heavy bleeding (soaking more than 1 pad per hour); Passing clots  3.   Foul odor from vagina  4.   Mastitis (Breast infection; breast pain, chills, fever, redness)  5.   Urinary pain, burning or frequency  6.   Episiotomy infection  7.   Abdominal incision infection  8.   Severe depression longer than 24 hours    HAND WASHING  · Prior to handling the baby.  · Before breastfeeding or bottle feeding baby.  · After using the bathroom or changing the baby's diaper.    WOUND CARE  Ask your physician for additional care instructions.  In general:    ·  Incision:      · Keep clean and dry.    · Do NOT lift anything heavier than your baby for up to 6 weeks.    · There should not be any opening or pus.      VAGINAL CARE  · Nothing inside vagina for 6 weeks: no sexual intercourse, tampons or douching.  · Bleeding may continue for 2-4 weeks.  Amount may vary.    · Call your physician for heavy bleeding which means soaking more than 1 pad per hour    BIRTH CONTROL  · It is  "possible to become pregnant at any time after delivery and while breastfeeding.  · Plan to discuss a method of birth control with your physician at your follow up visit. visit.    DIET AND ELIMINATION  · Eating more fiber (bran cereal, fruits, and vegetables) and drinking plenty of fluids will help to avoid constipation.  · Urinary frequency after childbirth is normal.    POSTPARTUM BLUES  During the first few days after birth, you may experience a sense of the \"blues\" which may include impatience, irritability or even crying.  These feeling come and go quickly.  However, as many as 1 in 10 women experience emotional symptoms known as postpartum depression.    Postpartum depression:  May start as early as the second or third day after delivery or take several weeks or months to develop.  Symptoms of \"blues\" are present, but are more intense:  Crying spells; loss of appetite; feelings of hopelessness or loss of control; fear of touching the baby; over concern or no concern at all about the baby; little or no concern about your own appearance/caring for yourself; and/or inability to sleep or excessive sleeping.  Contact your physician if you are experiencing any of these symptoms.    Crisis Hotline:  · [place] Crisis Hotline:  6-695-GXWMMZD  Or 1-787.727.2121  · [place] Crisis Hotline:  1-599.999.7990  Or 619-148-2372    PREVENTING SHAKEN BABY:  If you are angry or stressed, PUT THE BABY IN THE CRIB, step away, take some deep breaths, and wait until you are calm to care for the baby.  DO NOT SHAKE THE BABY.  You are not alone, call a supporter for help.    · Crisis Call Center 24/7 crisis line 062-282-3155 or 1-494.263.9557  · You can also text them, text \"ANSWER\" to 877497    QUIT SMOKING/TOBACCO USE:  I understand the use of any tobacco products increases my chance of suffering from future heart disease and could cause other illnesses which may shorten my life. Quitting the use of tobacco products is the single most " important thing I can do to improve my health. For further information on smoking / tobacco cessation call a Toll Free Quit Line at 1-791.655.9983 (*[employer]) or 1-801.831.9125 (American Lung Association) or you can access the web based program at www.lungusa.org.    · [place] Tobacco Users Help Line:  (354) 382-7309       Toll Free: 1-984.949.6328  · Quit Tobacco Program [facility] (092)247-1150    DEPRESSION / SUICIDE RISK:  As you are discharged from this [facility], it is important to learn how to keep safe from harming yourself.    Recognize the warning signs:  · Abrupt changes in personality, positive or negative- including increase in energy   · Giving away possessions  · Change in eating patterns- significant weight changes-  positive or negative  · Change in sleeping patterns- unable to sleep or sleeping all the time   · Unwillingness or inability to communicate  · Depression  · Unusual sadness, discouragement and loneliness  · Talk of wanting to die  · Neglect of personal appearance   · Rebelliousness- reckless behavior  · Withdrawal from people/activities they love  · Confusion- inability to concentrate     If you or a loved one observes any of these behaviors or has concerns about self-harm, here's what you can do:  · Talk about it- your feelings and reasons for harming yourself  · Remove any means that you might use to hurt yourself (examples: pills, rope, extension cords, firearm)  · Get professional help from the community (Mental Health, Substance Abuse, psychological counseling)  · Do not be alone:Call your Safe Contact- someone whom you trust who will be there for you.  · Call your local CRISIS HOTLINE 235-4478 or 823-053-4169  · Call your local Children's Mobile Crisis Response Team [place] (537) 627-5471 or www.Circassia  · Call the toll free National Suicide Prevention Hotlines   · National Suicide Prevention Lifeline 231-794-BGHF  (7720)  · [facility] 800-SUICIDE (435-0776)    DISCHARGE SURVEY:  Thank you for choosing [facility].  We hope we provided you with very good care.  You may be receiving a survey in the mail.  Please fill it out.  Your opinion is valuable to us.    ADDITIONAL EDUCATIONAL MATERIALS GIVEN TO PATIENT:        My signature on this form indicates that:  1.  I have reviewed and understand the above information  2.  My questions regarding this information have been answered to my satisfaction.  3.  I have formulated a plan with my discharge nurse to obtain my prescribed medication for home.

## 2018-12-07 NOTE — PROGRESS NOTES
Patient and infant discharged at 1115. Education, avs completed by discharge nurse. Carseat checked by RN.

## 2018-12-07 NOTE — DISCHARGE SUMMARY
Discharge Summary:      Marietta Paris      Admit Date:   2018  Discharge Date:  2018     Admitting diagnosis:  Delivery   delivery indicated due to breech presentation  Discharge Diagnosis: Status post primary  for breech presentation  Pregnancy Complications: Breech presentation  Tubal Ligation:  no        History:  History reviewed. No pertinent past medical history.  OB History    Para Term  AB Living   4 3 2 1 1 3   SAB TAB Ectopic Molar Multiple Live Births   1       0 3      # Outcome Date GA Lbr Christofer/2nd Weight Sex Delivery Anes PTL Lv   4 Term 18 39w1d  3.255 kg (7 lb 2.8 oz) F CS-LTranv Spinal N JESUS   3 SAB 17 12w0d             Birth Comments: No D&C   2  04 36w0d  3.175 kg (7 lb) F Vag-Spont None Y JESUS      Birth Comments: Pt states started to go into labor.    1 Term 02 40w0d  3.175 kg (7 lb) F Vag-Spont  N JESUS      Birth Comments: No complications           Patient has no known allergies.  Patient Active Problem List    Diagnosis Date Noted   • History of PTD at 36wk x 1 2018   • Encounter for supervision of normal pregnancy in second trimester 2017        Hospital Course:   33 y.o. , now , was admitted with the above mentioned diagnosis, underwent primary  for breech presentation. Patient postpartum course was unremarkable, with progressive advancement in diet, ambulation and toleration of oral analgesia. Patient without complaints today and desires discharge.      Abdominal pain: moderate  Ambulating: yes  tolerating liquids: yes  tolerating regular diet: yes  Flatus: yes  BM: yes  Bleeding: minimal lochia  Voiding: yes  Dizziness: no  Breast feeding: yes  Breast tenderness: mild    Vitals:    18 0800 18 0800 18   BP: (!) 95/51 100/67 101/81 119/71   Pulse: 74 92 68 69   Resp:    Temp: 37.2 °C (98.9 °F) 36.9 °C (98.5 °F) 36.7 °C (98  °F) 36.4 °C (97.5 °F)   TempSrc: Temporal Oral Temporal Temporal   SpO2: 97% 96% 99% 96%   Weight:       Height:           Current Facility-Administered Medications   Medication Dose   • ibuprofen (MOTRIN) tablet 800 mg  800 mg   • HYDROcodone-acetaminophen (NORCO) 5-325 MG per tablet 1 Tab  1 Tab   • oxyCODONE-acetaminophen (PERCOCET) 5-325 MG per tablet 2 Tab  2 Tab   • ondansetron (ZOFRAN) syringe/vial injection 4 mg  4 mg    Or   • ondansetron (ZOFRAN ODT) dispertab 4 mg  4 mg   • ferrous sulfate tablet 325 mg  325 mg   • lactated ringers (LR) infusion     • LR infusion     • oxytocin (PITOCIN) infusion (for postpartum)   mL/hr   • miSOPROStol (CYTOTEC) tablet 800 mcg  800 mcg   • methylergonovine (METHERGINE) injection 0.2 mg  0.2 mg   • carboPROST (HEMABATE) injection 250 mcg  250 mcg   • lactated ringers infusion  1,000 mL   • LR infusion     • miSOPROStol (CYTOTEC) tablet 600 mcg  600 mcg       Exam:  Vitals:    12/05/18 0800 12/05/18 2000 12/06/18 0800 12/06/18 2000   BP: (!) 95/51 100/67 101/81 119/71   Pulse: 74 92 68 69   Resp: 18 18 18 18   Temp: 37.2 °C (98.9 °F) 36.9 °C (98.5 °F) 36.7 °C (98 °F) 36.4 °C (97.5 °F)   TempSrc: Temporal Oral Temporal Temporal   SpO2: 97% 96% 99% 96%   Weight:       Height:         General: No acute distress, resting comfortably in bed.  HEENT: normocephalic, nontraumatic, EOMI  Cardiovascular: Heart RRR with no murmurs, rubs or gallops. Distal Pulses 2+  Respiratory: symmetric chest expansion, lungs CTA bilaterally with no wheezes rales or rhonci  Abdomen: soft, mildly tender around incision which is clean, dry and intact, fundus firm, +BS  Genitourinary: lochia light, denies excessive vaginal bleeding  Musculoskeletal: strength 5/5 in four extremities, no calf tenderness  Neuro: no focal deficits noted       Labs:  Recent Labs      12/04/18   1909   WBC  16.9*   RBC  3.05*   HEMOGLOBIN  10.0*   HEMATOCRIT  28.1*   MCV  92.1   MCH  32.8   MCHC  35.6*   RDW  45.3    PLATELETCT  171   MPV  10.9        Activity:   Discharge to home  Pelvic Rest x 6 weeks    Assessment:  normal postpartum course     Follow up: .  TP or [facility] in 5 weeks for post partum follow up; 1 week for incision check.   To resume daily PNV and iron supplement if needed with hydration.     Patient to return to [facility] or ER if any of the following occur:   Fever over 100.5   Severe abdominal pain   Red streaks or painful masses in the breasts   Foul smelling discharge or lochia   Heavy vaginal bleeding saturating a pad per hour   S/s of PP depression     Discharge Meds:      Medication List      START taking these medications      Instructions   docusate sodium 100 MG Caps  Commonly known as:  COLACE   Take 1 Cap by mouth 2 times a day.  Dose:  100 mg     ferrous sulfate 325 (65 Fe) MG tablet   Take 1 Tab by mouth 3 times a day, with meals.  Dose:  325 mg     HYDROcodone-acetaminophen 5-325 MG Tabs per tablet  Commonly known as:  NORCO   Take 1 Tab by mouth every 6 hours as needed (For pain) for up to 5 days.  Dose:  1 Tab     ibuprofen 800 MG Tabs  Commonly known as:  MOTRIN   Take 1 Tab by mouth every 8 hours as needed for Moderate Pain (For cramping after delivery).  Dose:  800 mg        CONTINUE taking these medications      Instructions   PRENATAL VITAMINS PO   Take  by mouth.        STOP taking these medications    Doxylamine-Pyridoxine 10-10 MG Tbec delayed-release tablet            Thania Yanez M.D.

## 2018-12-07 NOTE — LACTATION NOTE
Followup visit with MOB, using daughter to interpret per MOB request. Baby rooting and crying.Encouraged to latch bby to breast. Nipples cracked bilaterally. Discussed how to use nipple to nose position for wider gape. Taught how to flange lower lip after baby latches. Discussed feeding frequency and feeding on cue vs times feedings. Audible and observed swallows noted at breast. Encouraged to air dry her milk on her nipples and follow with lanolin if desired for healing.

## 2018-12-07 NOTE — CARE PLAN
Problem: Altered physiologic condition related to postoperative  delivery  Goal: Patient physiologically stable as evidenced by normal lochia, palpable uterine involution and vital signs within normal limits  Outcome: PROGRESSING AS EXPECTED  Fundus firm, lochia scant     Problem: Alteration in comfort related to surgical incision and/or after birth pains  Goal: Patient verbalizes acceptable pain level  Outcome: PROGRESSING AS EXPECTED  Patient declines pain, will call if pain med intervention needed.

## 2019-01-10 ENCOUNTER — POST PARTUM (OUTPATIENT)
Dept: OBGYN | Facility: CLINIC | Age: 34
End: 2019-01-10

## 2019-01-10 VITALS — SYSTOLIC BLOOD PRESSURE: 114 MMHG | BODY MASS INDEX: 27.64 KG/M2 | DIASTOLIC BLOOD PRESSURE: 64 MMHG | WEIGHT: 161 LBS

## 2019-01-10 PROBLEM — Z34.92 ENCOUNTER FOR SUPERVISION OF NORMAL PREGNANCY IN SECOND TRIMESTER: Status: RESOLVED | Noted: 2017-08-28 | Resolved: 2019-01-10

## 2019-01-10 PROBLEM — O09.892 HISTORY OF PRETERM DELIVERY, CURRENTLY PREGNANT IN SECOND TRIMESTER: Status: RESOLVED | Noted: 2018-06-22 | Resolved: 2019-01-10

## 2019-01-10 PROCEDURE — 90050 PR POSTPARTUM VISIT: CPT | Performed by: NURSE PRACTITIONER

## 2019-01-10 RX ORDER — NORGESTIMATE AND ETHINYL ESTRADIOL 0.25-0.035
1 KIT ORAL DAILY
Qty: 30 TAB | Refills: 11 | Status: SHIPPED | OUTPATIENT
Start: 2019-01-10 | End: 2019-02-09

## 2019-01-10 NOTE — PROGRESS NOTES
Pt here today for postpartum exam.  Operation Date: 12/4/18  Currently: bottle feeding  BCM: Pt would like BC pills, [facility].   Good ph:[phone]  Pt states having back pain, also having pain near incision   Chaperone offered and accepted  Last PAP 6/21/118, WNL

## 2019-01-10 NOTE — PATIENT INSTRUCTIONS
Plan   Plan:    1. Encourage SBE.  2. Pill education given.  3. Contraceptive counseling -rx for sprintec sent to pharmacy.  4. Encouraged condom use   5. Discussed diet, exercise and resumption of sexual activity   6. Gave copy of pap, f/u 3yr  7.  F/u c PCP or [facility] clinic as needed for primary care needs.

## 2019-01-10 NOTE — PROGRESS NOTES
Subjective:    Marietta Paris is a 33 y.o.  female who presents for her postpartum exam. She had LTCS for breech without complication. Her prenatal course was uncomplicated. She denies dysuria, vaginal bleeding, odor, itching or breast problems. She is bottlefeeding. She desires an  for her birth control method. Has used this method before with good results.  Reports no sex prior to this appointment.  Denies any S/S of PP depression.     Postpartum care        HPI  Review of Systems   All other systems reviewed and are negative.       Objective:     /64   Wt 73 kg (161 lb)   LMP 03/05/2018   BMI 27.64 kg/m²      Physical Exam   Constitutional: She is oriented to person, place, and time. She appears well-developed and well-nourished.   HENT:   Head: Normocephalic and atraumatic.   Eyes:   Eye and ear exam deferred   Neck: Normal range of motion. Neck supple. No thyromegaly present.   Cardiovascular: Normal rate, regular rhythm, normal heart sounds and intact distal pulses.    Pulmonary/Chest: Effort normal and breath sounds normal. Right breast exhibits no inverted nipple, no mass, no nipple discharge, no skin change and no tenderness. Left breast exhibits no inverted nipple, no mass, no nipple discharge, no skin change and no tenderness. Breasts are symmetrical. There is no breast swelling.   Abdominal: Soft. Normal appearance and bowel sounds are normal. There is no CVA tenderness.   Genitourinary: Vagina normal and uterus normal. No breast tenderness, discharge or bleeding. Pelvic exam was performed with patient supine. No labial fusion. There is no rash, tenderness, lesion or injury on the right labia. There is no rash, tenderness, lesion or injury on the left labia. Cervix exhibits no motion tenderness, no discharge and no friability. Right adnexum displays no mass, no tenderness and no fullness. Left adnexum displays no mass, no tenderness and no fullness.    Musculoskeletal: Normal range of motion.   Neurological: She is alert and oriented to person, place, and time. She has normal reflexes.   Skin: Skin is warm and dry. Capillary refill takes less than 2 seconds.   Incision well healed   Psychiatric: She has a normal mood and affect. Her behavior is normal. Judgment and thought content normal.   Nursing note and vitals reviewed.          Assessment   Assessment:    1. PP care s/p LTCS  2. Exam WNL   3. Pap WNL   4. Desires contraception     Patient Active Problem List    Diagnosis Date Noted   •  delivery delivered 2018       Plan   Plan:    1. Encourage SBE.  2. Pill education given.  3. Contraceptive counseling -rx for sprintec sent to pharmacy.  4. Encouraged condom use   5. Discussed diet, exercise and resumption of sexual activity   6. Gave copy of pap, f/u 3yr  7.  F/u c PCP or [facility] clinic as needed for primary care needs.     Chaperone offered and provided by Ruthie Bone MA.

## 2021-11-10 NOTE — PROGRESS NOTES
Pt here today for OB follow up  Reports +FM  WT: 175 lb  BP: 108/52  Tdap vaccine  BTL Discussed, pt declines  LALITO sheet given and explained today  Pt states she is having lower back pain, sciatica pain and pain between her legs, pt also reports left ear pain and not able to hear.   Tdapo vaccine offered today, pt would like to think about it and will let us know at her next appt. Information given to patient today.  [facility] # [phone]     No

## (undated) DEVICE — SHEATH RO 4F 10CM (10EA/BX)

## (undated) DEVICE — DRESSING INTERCEED ABSORBABLE ADHESION BARRIER TC7 (10EA/CA)

## (undated) DEVICE — PACK C-SECTION (2EA/CA)

## (undated) DEVICE — TRAY BLADDER CARE W/ 16 FR FOLEY CATHETER STATLOCK  (10/CA)

## (undated) DEVICE — SODIUM CHL IRRIGATION 0.9% 1000ML (12EA/CA)

## (undated) DEVICE — DETERGENT RENUZYME PLUS 10 OZ PACKET (50/BX)

## (undated) DEVICE — PACK ROOM TURNOVER L&D (12/CA)

## (undated) DEVICE — BLANKET UNDERBODY FULL ACCES - (5/CA)

## (undated) DEVICE — CATHETER IV NON-SAFETY 18 GA X 1 1/4 (50/BX 4BX/CA)

## (undated) DEVICE — WATER IRRIG. STER. 1500 ML - (9/CA)

## (undated) DEVICE — TAPE CLOTH MEDIPORE 6 INCH - (12RL/CA)

## (undated) DEVICE — PAD GROUNDING PRE-JELLED - (50EA/PK)

## (undated) DEVICE — SLEEVE, SEQUENTIAL CALF REG

## (undated) DEVICE — SET EXTENSION WITH 2 PORTS (48EA/CA) ***PART #2C8610 IS A SUBSTITUTE*****

## (undated) DEVICE — CHLORAPREP 26 ML APPLICATOR - ORANGE TINT(25/CA)

## (undated) DEVICE — TRAY SPINAL ANESTHESIA NON-SAFETY (10/CA)

## (undated) DEVICE — HEAD HOLDER JUNIOR/ADULT

## (undated) DEVICE — TUBING CLEARLINK DUO-VENT - C-FLO (48EA/CA)

## (undated) DEVICE — KIT  I.V. START (100EA/CA)

## (undated) DEVICE — CANISTER SUCTION 3000ML MECHANICAL FILTER AUTO SHUTOFF MEDI-VAC NONSTERILE LF DISP  (40EA/CA)